# Patient Record
Sex: FEMALE | Race: BLACK OR AFRICAN AMERICAN | NOT HISPANIC OR LATINO | Employment: OTHER | URBAN - METROPOLITAN AREA
[De-identification: names, ages, dates, MRNs, and addresses within clinical notes are randomized per-mention and may not be internally consistent; named-entity substitution may affect disease eponyms.]

---

## 2017-11-10 ENCOUNTER — CONVERSION ENCOUNTER (OUTPATIENT)
Dept: GENERAL RADIOLOGY | Facility: HOSPITAL | Age: 72
End: 2017-11-10

## 2018-08-17 ENCOUNTER — OFFICE VISIT CONVERTED (OUTPATIENT)
Dept: CARDIOLOGY | Facility: CLINIC | Age: 73
End: 2018-08-17
Attending: INTERNAL MEDICINE

## 2018-12-10 ENCOUNTER — CONVERSION ENCOUNTER (OUTPATIENT)
Dept: GENERAL RADIOLOGY | Facility: HOSPITAL | Age: 73
End: 2018-12-10

## 2019-03-05 ENCOUNTER — OFFICE VISIT CONVERTED (OUTPATIENT)
Dept: CARDIOLOGY | Facility: CLINIC | Age: 74
End: 2019-03-05
Attending: INTERNAL MEDICINE

## 2019-03-21 ENCOUNTER — CONVERSION ENCOUNTER (OUTPATIENT)
Dept: CARDIOLOGY | Facility: CLINIC | Age: 74
End: 2019-03-21
Attending: INTERNAL MEDICINE

## 2019-05-28 ENCOUNTER — HOSPITAL ENCOUNTER (OUTPATIENT)
Dept: GENERAL RADIOLOGY | Facility: HOSPITAL | Age: 74
Discharge: HOME | End: 2019-05-28
Attending: NURSE PRACTITIONER

## 2019-06-18 ENCOUNTER — HOSPITAL ENCOUNTER (OUTPATIENT)
Dept: SURGERY | Facility: HOSPITAL | Age: 74
Setting detail: HOSPITAL OUTPATIENT SURGERY
Discharge: HOME | End: 2019-06-18
Attending: OPHTHALMOLOGY

## 2019-06-18 LAB — GLUCOSE BLD-MCNC: 111 MG/DL (ref 65–99)

## 2019-07-01 ENCOUNTER — HOSPITAL ENCOUNTER (OUTPATIENT)
Dept: OTHER | Facility: HOSPITAL | Age: 74
Discharge: HOME | End: 2019-07-01
Attending: NURSE PRACTITIONER

## 2019-07-01 LAB
T4 FREE SERPL-MCNC: 1.1 NG/DL (ref 0.9–1.8)
TSH SERPL-ACNC: 2.59 M[IU]/L (ref 0.27–4.2)

## 2019-07-02 ENCOUNTER — HOSPITAL ENCOUNTER (OUTPATIENT)
Dept: SURGERY | Facility: HOSPITAL | Age: 74
Setting detail: HOSPITAL OUTPATIENT SURGERY
Discharge: HOME | End: 2019-07-02
Attending: OPHTHALMOLOGY

## 2019-07-02 LAB — GLUCOSE BLD-MCNC: 121 MG/DL (ref 65–99)

## 2019-07-08 ENCOUNTER — HOSPITAL ENCOUNTER (OUTPATIENT)
Dept: GENERAL RADIOLOGY | Facility: HOSPITAL | Age: 74
Discharge: HOME | End: 2019-07-08
Attending: NURSE PRACTITIONER

## 2019-08-09 ENCOUNTER — CONVERSION ENCOUNTER (OUTPATIENT)
Dept: GASTROENTEROLOGY | Facility: CLINIC | Age: 74
End: 2019-08-09
Attending: INTERNAL MEDICINE

## 2019-09-23 ENCOUNTER — HOSPITAL ENCOUNTER (OUTPATIENT)
Dept: OTHER | Facility: HOSPITAL | Age: 74
Discharge: HOME | End: 2019-09-23
Attending: INTERNAL MEDICINE

## 2019-09-23 LAB
ALBUMIN SERPL-MCNC: 4.3 G/DL (ref 3.5–5)
ALBUMIN/GLOB SERPL: 1.7 {RATIO} (ref 1.4–2.6)
ALP SERPL-CCNC: 73 U/L (ref 43–160)
ALT SERPL-CCNC: 31 U/L (ref 10–40)
ANION GAP SERPL CALC-SCNC: 16 MMOL/L (ref 8–19)
AST SERPL-CCNC: 26 U/L (ref 15–50)
BILIRUB SERPL-MCNC: 0.29 MG/DL (ref 0.2–1.3)
BUN SERPL-MCNC: 8 MG/DL (ref 5–25)
BUN/CREAT SERPL: 9 {RATIO} (ref 6–20)
CALCIUM SERPL-MCNC: 9.4 MG/DL (ref 8.7–10.4)
CHLORIDE SERPL-SCNC: 108 MMOL/L (ref 99–111)
CHOLEST SERPL-MCNC: 131 MG/DL (ref 107–200)
CHOLEST/HDLC SERPL: 2.2 {RATIO} (ref 3–6)
CONV CO2: 24 MMOL/L (ref 22–32)
CONV TOTAL PROTEIN: 6.9 G/DL (ref 6.3–8.2)
CREAT UR-MCNC: 0.91 MG/DL (ref 0.5–0.9)
GFR SERPLBLD BASED ON 1.73 SQ M-ARVRAT: >60 ML/MIN/{1.73_M2}
GLOBULIN UR ELPH-MCNC: 2.6 G/DL (ref 2–3.5)
GLUCOSE SERPL-MCNC: 104 MG/DL (ref 65–99)
HDLC SERPL-MCNC: 59 MG/DL (ref 40–60)
LDLC SERPL CALC-MCNC: 61 MG/DL (ref 70–100)
OSMOLALITY SERPL CALC.SUM OF ELEC: 297 MOSM/KG (ref 273–304)
POTASSIUM SERPL-SCNC: 3.8 MMOL/L (ref 3.5–5.3)
SODIUM SERPL-SCNC: 144 MMOL/L (ref 135–147)
TRIGL SERPL-MCNC: 54 MG/DL (ref 40–150)
VLDLC SERPL-MCNC: 11 MG/DL (ref 5–37)

## 2019-09-27 ENCOUNTER — OFFICE VISIT CONVERTED (OUTPATIENT)
Dept: CARDIOLOGY | Facility: CLINIC | Age: 74
End: 2019-09-27
Attending: INTERNAL MEDICINE

## 2019-10-11 ENCOUNTER — HOSPITAL ENCOUNTER (OUTPATIENT)
Dept: GASTROENTEROLOGY | Facility: HOSPITAL | Age: 74
Setting detail: HOSPITAL OUTPATIENT SURGERY
Discharge: HOME | End: 2019-10-11
Attending: INTERNAL MEDICINE

## 2019-10-11 LAB — GLUCOSE BLD-MCNC: 102 MG/DL (ref 65–99)

## 2020-01-13 ENCOUNTER — HOSPITAL ENCOUNTER (OUTPATIENT)
Dept: OTHER | Facility: HOSPITAL | Age: 75
Discharge: HOME | End: 2020-01-13
Attending: PHYSICIAN ASSISTANT

## 2020-01-13 LAB
T4 FREE SERPL-MCNC: 1 NG/DL (ref 0.9–1.8)
TSH SERPL-ACNC: 6.08 M[IU]/L (ref 0.27–4.2)

## 2020-01-21 ENCOUNTER — HOSPITAL ENCOUNTER (OUTPATIENT)
Dept: GENERAL RADIOLOGY | Facility: HOSPITAL | Age: 75
Discharge: HOME | End: 2020-01-21
Attending: NURSE PRACTITIONER

## 2020-02-13 ENCOUNTER — HOSPITAL ENCOUNTER (OUTPATIENT)
Dept: OTHER | Facility: HOSPITAL | Age: 75
Discharge: HOME | End: 2020-02-13
Attending: PHYSICIAN ASSISTANT

## 2020-02-13 LAB
T4 FREE SERPL-MCNC: 1.3 NG/DL (ref 0.9–1.8)
TSH SERPL-ACNC: 1.34 M[IU]/L (ref 0.27–4.2)

## 2020-02-17 ENCOUNTER — HOSPITAL ENCOUNTER (OUTPATIENT)
Dept: ULTRASOUND IMAGING | Facility: HOSPITAL | Age: 75
Discharge: HOME | End: 2020-02-17
Attending: PHYSICIAN ASSISTANT

## 2020-06-10 ENCOUNTER — OFFICE VISIT CONVERTED (OUTPATIENT)
Dept: CARDIOLOGY | Facility: CLINIC | Age: 75
End: 2020-06-10
Attending: INTERNAL MEDICINE

## 2020-06-11 ENCOUNTER — HOSPITAL ENCOUNTER (OUTPATIENT)
Dept: LAB | Facility: HOSPITAL | Age: 75
Discharge: HOME | End: 2020-06-11
Attending: INTERNAL MEDICINE

## 2020-06-11 LAB
ALBUMIN SERPL-MCNC: 4.2 G/DL (ref 3.5–5)
ALBUMIN/GLOB SERPL: 1.5 {RATIO} (ref 1.4–2.6)
ALP SERPL-CCNC: 74 U/L (ref 43–160)
ALT SERPL-CCNC: 35 U/L (ref 10–40)
ANION GAP SERPL CALC-SCNC: 19 MMOL/L (ref 8–19)
AST SERPL-CCNC: 33 U/L (ref 15–50)
BILIRUB SERPL-MCNC: 0.35 MG/DL (ref 0.2–1.3)
BUN SERPL-MCNC: 11 MG/DL (ref 5–25)
BUN/CREAT SERPL: 13 {RATIO} (ref 6–20)
CALCIUM SERPL-MCNC: 9.5 MG/DL (ref 8.7–10.4)
CHLORIDE SERPL-SCNC: 108 MMOL/L (ref 99–111)
CHOLEST SERPL-MCNC: 133 MG/DL (ref 107–200)
CHOLEST/HDLC SERPL: 2.4 {RATIO} (ref 3–6)
CONV CO2: 21 MMOL/L (ref 22–32)
CONV TOTAL PROTEIN: 7 G/DL (ref 6.3–8.2)
CREAT UR-MCNC: 0.83 MG/DL (ref 0.5–0.9)
GFR SERPLBLD BASED ON 1.73 SQ M-ARVRAT: >60 ML/MIN/{1.73_M2}
GLOBULIN UR ELPH-MCNC: 2.8 G/DL (ref 2–3.5)
GLUCOSE SERPL-MCNC: 108 MG/DL (ref 65–99)
HDLC SERPL-MCNC: 56 MG/DL (ref 40–60)
LDLC SERPL CALC-MCNC: 65 MG/DL (ref 70–100)
OSMOLALITY SERPL CALC.SUM OF ELEC: 298 MOSM/KG (ref 273–304)
POTASSIUM SERPL-SCNC: 4.2 MMOL/L (ref 3.5–5.3)
SODIUM SERPL-SCNC: 144 MMOL/L (ref 135–147)
T4 FREE SERPL-MCNC: 1.4 NG/DL (ref 0.9–1.8)
TRIGL SERPL-MCNC: 62 MG/DL (ref 40–150)
TSH SERPL-ACNC: 2.56 M[IU]/L (ref 0.27–4.2)
VLDLC SERPL-MCNC: 12 MG/DL (ref 5–37)

## 2020-09-11 ENCOUNTER — HOSPITAL ENCOUNTER (OUTPATIENT)
Dept: LAB | Facility: HOSPITAL | Age: 75
Discharge: HOME | End: 2020-09-11
Attending: PHYSICIAN ASSISTANT

## 2020-09-12 LAB
T3FREE SERPL-MCNC: 2.7 PG/ML (ref 2–4.4)
T4 FREE SERPL-MCNC: 1.3 NG/DL (ref 0.9–1.8)
TSH SERPL-ACNC: 1.66 M[IU]/L (ref 0.27–4.2)

## 2020-12-07 ENCOUNTER — HOSPITAL ENCOUNTER (OUTPATIENT)
Dept: GENERAL RADIOLOGY | Facility: HOSPITAL | Age: 75
Discharge: HOME | End: 2020-12-07
Attending: PHYSICIAN ASSISTANT

## 2020-12-16 ENCOUNTER — OFFICE VISIT CONVERTED (OUTPATIENT)
Dept: CARDIOLOGY | Facility: CLINIC | Age: 75
End: 2020-12-16
Attending: INTERNAL MEDICINE

## 2020-12-17 ENCOUNTER — HOSPITAL ENCOUNTER (OUTPATIENT)
Dept: LAB | Facility: HOSPITAL | Age: 75
Discharge: HOME | End: 2020-12-17
Attending: INTERNAL MEDICINE

## 2020-12-17 LAB
ALBUMIN SERPL-MCNC: 4.2 G/DL (ref 3.5–5)
ALBUMIN/GLOB SERPL: 1.4 {RATIO} (ref 1.4–2.6)
ALP SERPL-CCNC: 76 U/L (ref 43–160)
ALT SERPL-CCNC: 29 U/L (ref 10–40)
ANION GAP SERPL CALC-SCNC: 14 MMOL/L (ref 8–19)
AST SERPL-CCNC: 31 U/L (ref 15–50)
BILIRUB SERPL-MCNC: 0.36 MG/DL (ref 0.2–1.3)
BUN SERPL-MCNC: 16 MG/DL (ref 5–25)
BUN/CREAT SERPL: 15 {RATIO} (ref 6–20)
CALCIUM SERPL-MCNC: 9.9 MG/DL (ref 8.7–10.4)
CHLORIDE SERPL-SCNC: 104 MMOL/L (ref 99–111)
CHOLEST SERPL-MCNC: 140 MG/DL (ref 107–200)
CHOLEST/HDLC SERPL: 2.5 {RATIO} (ref 3–6)
CONV CO2: 26 MMOL/L (ref 22–32)
CONV TOTAL PROTEIN: 7.1 G/DL (ref 6.3–8.2)
CREAT UR-MCNC: 1.07 MG/DL (ref 0.5–0.9)
GFR SERPLBLD BASED ON 1.73 SQ M-ARVRAT: 58 ML/MIN/{1.73_M2}
GLOBULIN UR ELPH-MCNC: 2.9 G/DL (ref 2–3.5)
GLUCOSE SERPL-MCNC: 107 MG/DL (ref 65–99)
HDLC SERPL-MCNC: 55 MG/DL (ref 40–60)
LDLC SERPL CALC-MCNC: 72 MG/DL (ref 70–100)
OSMOLALITY SERPL CALC.SUM OF ELEC: 292 MOSM/KG (ref 273–304)
POTASSIUM SERPL-SCNC: 4 MMOL/L (ref 3.5–5.3)
SODIUM SERPL-SCNC: 140 MMOL/L (ref 135–147)
TRIGL SERPL-MCNC: 67 MG/DL (ref 40–150)
VLDLC SERPL-MCNC: 13 MG/DL (ref 5–37)

## 2020-12-29 ENCOUNTER — HOSPITAL ENCOUNTER (OUTPATIENT)
Dept: ULTRASOUND IMAGING | Facility: HOSPITAL | Age: 75
Discharge: HOME | End: 2020-12-29
Attending: PHYSICIAN ASSISTANT

## 2021-02-10 ENCOUNTER — HOSPITAL ENCOUNTER (OUTPATIENT)
Dept: VACCINE CLINIC | Facility: HOSPITAL | Age: 76
Discharge: HOME | End: 2021-02-10
Attending: INTERNAL MEDICINE

## 2021-02-12 ENCOUNTER — HOSPITAL ENCOUNTER (OUTPATIENT)
Dept: PREADMISSION TESTING | Facility: HOSPITAL | Age: 76
Discharge: HOME | End: 2021-02-12
Attending: INTERNAL MEDICINE

## 2021-02-13 LAB — SARS-COV-2 RNA SPEC QL NAA+PROBE: NOT DETECTED

## 2021-03-03 ENCOUNTER — HOSPITAL ENCOUNTER (OUTPATIENT)
Dept: PREADMISSION TESTING | Facility: HOSPITAL | Age: 76
Discharge: HOME | End: 2021-03-03
Attending: INTERNAL MEDICINE

## 2021-03-04 LAB — SARS-COV-2 RNA SPEC QL NAA+PROBE: NOT DETECTED

## 2021-03-08 ENCOUNTER — HOSPITAL ENCOUNTER (OUTPATIENT)
Dept: GASTROENTEROLOGY | Facility: HOSPITAL | Age: 76
Setting detail: HOSPITAL OUTPATIENT SURGERY
Discharge: HOME | End: 2021-03-08
Attending: INTERNAL MEDICINE

## 2021-03-08 LAB — GLUCOSE BLD-MCNC: 115 MG/DL (ref 65–99)

## 2021-03-12 ENCOUNTER — HOSPITAL ENCOUNTER (OUTPATIENT)
Dept: VACCINE CLINIC | Facility: HOSPITAL | Age: 76
Discharge: HOME | End: 2021-03-12
Attending: INTERNAL MEDICINE

## 2021-04-26 ENCOUNTER — HOSPITAL ENCOUNTER (OUTPATIENT)
Dept: GENERAL RADIOLOGY | Facility: HOSPITAL | Age: 76
Discharge: HOME | End: 2021-04-26
Attending: NURSE PRACTITIONER

## 2021-05-10 ENCOUNTER — OFFICE VISIT CONVERTED (OUTPATIENT)
Dept: NEUROLOGY | Facility: CLINIC | Age: 76
End: 2021-05-10
Attending: NURSE PRACTITIONER

## 2021-05-13 NOTE — PROGRESS NOTES
"   Progress Note      Patient Name: Lizzeth Jean   Patient ID: [id number]   Sex: Female   YOB: 1945    Primary Care Provider: Radha Ritchie NP   Referring Provider: Radha Ritchie NP    Visit Date: Taylor 10, 2020    Provider: Dorian Turner MD   Location: [facility]   Location Address: [address] [place] [address]  [id number]   Location Phone: [phone]          Chief Complaint  · Coronary artery disease       History Of Present Illness  REFERRING CARE PROVIDER: Radha Ritchie NP   Lizzeth Jean is a 75 year old /Black female with coronary artery disease, prior stenting in 2002, hypertension and dyslipidemia, who has done well. No chest pain, shortness of breath or other complaints.   PAST MEDICAL HISTORY: Coronary artery disease with prior stent in 2002; hypertension; dyslipidemia; intermittent palpitations.   FAMILY HISTORY: Positive for hypertension and heart disease. Negative for diabetes.   PSYCHOSOCIAL HISTORY: Positive for mood changes and depression. She never used alcohol or tobacco.   CURRENT MEDICATIONS: include ASA 81 mg daily; Hydrocodone/acetaminophen b.i.d.; Hydroxyzine 25 mg daily; Zetia 10 mg daily; Atenolol 25 mg b.i.d.; Felodipine 10 mg daily; Citalopram 40 mg daily; Buspirone 15 mg b.i.d.; Crestor 5 mg daily; Synthroid 25 mcg daily. The dosage and frequency of the medications were reviewed with the patient.       Review of Systems  · Cardiovascular  o Admits  o : swelling (feet, ankles, hands)  o Denies  o : palpitations (fast, fluttering, or skipping beats), shortness of breath while walking or lying flat, chest pain or angina pectoris   · Respiratory  o Denies  o : chronic or frequent cough, asthma or wheezing      Vitals  Date Time BP Position Site L\R Cuff Size HR RR TEMP (F) WT  HT  BMI kg/m2 BSA m2 O2 Sat HC       06/10/2020 11:14 /44 Sitting    58 - R   142lbs 0oz 5'  2\" 25.97 1.68     06/10/2020 11:14 /62 " Sitting    58 - R                 Physical Examination  · Constitutional  o Appearance  o : Awake, alert, in no acute distress.   · Eyes  o Conjunctivae  o : Normal.  · Ears, Nose, Mouth and Throat  o Oral Cavity  o :   § Oral Mucosa  § : Normal.  · Neck  o Inspection/Palpation  o : No JVD. Good carotid upstroke. No thyromegaly.  · Respiratory  o Respiratory  o : Good respiratory effort. Clear to percussion and auscultation.  · Cardiovascular  o Heart  o :   § Auscultation of Heart  § : S1, S2 normal. Regular rate and rhythm without murmurs, gallops, or rubs.  o Peripheral Vascular System  o :   § Extremities  § : Good femoral and pedal pulses. No pedal edema.  · Gastrointestinal  o Abdominal Examination  o : Soft. No tenderness or masses felt. No hepatosplenomegaly. Abdominal aorta is not palpable.          Assessment     ASSESSMENT AND PLAN:    1.  Coronary artery disease with prior stenting:  No angina and on chronic ASA 81 mg once a day.  2.  Hyperlipidemia:  On Crestor and Zetia, tolerating well.  LDL previously at goal.  Repeat before the next visit.  3.  Hypertension controlled.    MD Louise Sommer/nhan         This note was transcribed by Nini Arango.  nhan/louise   The above service was transcribed by Nini Arango, and I attest to the accuracy of the note.  LOUISE.               Electronically Signed by: Nini Arango-, -Author on June 16, 2020 09:10:35 AM  Electronically Co-signed by: Dorian Turner MD -Reviewer on June 17, 2020 05:41:36 PM

## 2021-05-14 VITALS
HEIGHT: 62 IN | BODY MASS INDEX: 26.13 KG/M2 | DIASTOLIC BLOOD PRESSURE: 72 MMHG | HEART RATE: 58 BPM | SYSTOLIC BLOOD PRESSURE: 132 MMHG | WEIGHT: 142 LBS

## 2021-05-14 NOTE — PROGRESS NOTES
"   Progress Note      Patient Name: Lizzeth Jean   Patient ID: [id number]   Sex: Female   YOB: 1945    Primary Care Provider: Radha Ritchie NP   Referring Provider: Radha Ritchie NP    Visit Date: December 16, 2020    Provider: Dorian Turner MD   Location: [facility]   Location Address: [address] [place] [address], [facility]   [place], [place]  [id number]   Location Phone: [phone]          Chief Complaint     Coronary artery disease.       History Of Present Illness  REFERRING CARE PROVIDER: Radha Ritchie NP   Lizzeth Jean is a 75 year old /Black female with known CAD and prior stent in 2002, hypertension, and dyslipidemia who has had dyspnea on exertion issues since being seen last. No anginal discomfort. No weight gain.   PAST MEDICAL HISTORY: Coronary artery disease with prior stent in 2002; Dyslipidemia; Hypertension; Palpitations.   PSYCHOSOCIAL HISTORY: Previously smoked, but quit 20 years ago. Denies alcohol use.   CURRENT MEDICATIONS: Zetia 10 mg daily; Crestor 5 mg daily; aspirin 81 mg daily; nitroglycerin 0.4 mg sublingual p.r.n.; Plendil 10 mg daily; Celexa 40 mg daily; Buspar 15 mg 1/2 to 1 daily; Norco p.r.n.; levothyroxine 25 mcg daily; Tenormin 25 mg; Advair.      ALLERGIES:  Adhesive Tape; Latex Exam Gloves; Mobic; morphine; Omnicef; Toprol XL; Tricor; Zocor.  Some antibiotics.       Review of Systems  · Cardiovascular  o Admits  o : shortness of breath while walking or lying flat  o Denies  o : palpitations (fast, fluttering, or skipping beats), swelling (feet, ankles, hands), chest pain or angina pectoris   · Respiratory  o Denies  o : chronic or frequent cough      Vitals  Date Time BP Position Site L\R Cuff Size HR RR TEMP (F) WT  HT  BMI kg/m2 BSA m2 O2 Sat FR L/min FiO2        12/16/2020 10:39 /72 Sitting    58 - R   142lbs 0oz 5'  2\" 25.97 1.68             Physical Examination  · Constitutional  o Appearance  o : Awake, alert, in no acute distress. "   · Eyes  o Conjunctivae  o : Normal.  · Ears, Nose, Mouth and Throat  o Oral Cavity  o :   § Oral Mucosa  § : Normal.  · Neck  o Inspection/Palpation  o : No JVD. Good carotid upstroke. No thyromegaly.  · Respiratory  o Respiratory  o : Good respiratory effort. Clear to percussion and auscultation.  · Cardiovascular  o Heart  o :   § Auscultation of Heart  § : S1, S2 normal. Regular rate and rhythm without murmurs, gallops, or rubs.  o Peripheral Vascular System  o :   § Extremities  § : Good femoral and pedal pulses. No pedal edema.  · Gastrointestinal  o Abdominal Examination  o : Soft. No tenderness or masses felt. No hepatosplenomegaly. Abdominal aorta is not palpable.  · EKG  o EKG  o : Performed in the office today.  o Indications  o : Coronary artery disease.  o Results  o : Normal sinus rhythm with old inferior wall MI.  o Comparison  o : No change from prior EKG.           Assessment     ASSESSMENT & PLAN:     1.  Coronary artery disease with prior stent.  No angina.  On chronic aspirin 81 mg once a day.  2.  Hyperlipidemia.  Patient on statin and Zetia and tolerating well.  Awaiting lipid results.  Goal LDL of less 70.  3.  Hypertension, controlled.             Electronically Signed by: Jazmine Pizano-, Other -Author on December 23, 2020 10:36:30 AM  Electronically Co-signed by: Dorian Turner MD -Reviewer on January 4, 2021 01:00:46 PM

## 2021-05-15 VITALS
SYSTOLIC BLOOD PRESSURE: 118 MMHG | HEIGHT: 62 IN | BODY MASS INDEX: 25.4 KG/M2 | WEIGHT: 138 LBS | HEART RATE: 58 BPM | DIASTOLIC BLOOD PRESSURE: 56 MMHG

## 2021-05-15 VITALS
HEART RATE: 58 BPM | WEIGHT: 142 LBS | HEIGHT: 62 IN | DIASTOLIC BLOOD PRESSURE: 44 MMHG | BODY MASS INDEX: 26.13 KG/M2 | SYSTOLIC BLOOD PRESSURE: 116 MMHG

## 2021-05-16 VITALS
WEIGHT: 130 LBS | HEIGHT: 62 IN | HEART RATE: 62 BPM | BODY MASS INDEX: 23.92 KG/M2 | SYSTOLIC BLOOD PRESSURE: 126 MMHG | DIASTOLIC BLOOD PRESSURE: 76 MMHG

## 2021-05-16 VITALS
HEIGHT: 62 IN | HEART RATE: 66 BPM | DIASTOLIC BLOOD PRESSURE: 60 MMHG | WEIGHT: 144 LBS | BODY MASS INDEX: 26.5 KG/M2 | SYSTOLIC BLOOD PRESSURE: 114 MMHG

## 2021-05-25 ENCOUNTER — HOSPITAL ENCOUNTER (OUTPATIENT)
Dept: MRI IMAGING | Facility: HOSPITAL | Age: 76
Discharge: HOME | End: 2021-05-25
Attending: NURSE PRACTITIONER

## 2021-05-25 LAB
CREAT BLD-MCNC: 1 MG/DL (ref 0.6–1.4)
GFR SERPLBLD BASED ON 1.73 SQ M-ARVRAT: >60 ML/MIN/{1.73_M2}

## 2021-06-03 ENCOUNTER — HOSPITAL ENCOUNTER (OUTPATIENT)
Dept: GENERAL RADIOLOGY | Facility: HOSPITAL | Age: 76
Discharge: HOME | End: 2021-06-03
Attending: PHYSICIAN ASSISTANT
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
## 2021-06-08 ENCOUNTER — TELEPHONE (OUTPATIENT)
Dept: CARDIOLOGY | Facility: CLINIC | Age: 76
End: 2021-06-08

## 2021-06-08 ENCOUNTER — OFFICE VISIT (OUTPATIENT)
Dept: NEUROSURGERY | Facility: CLINIC | Age: 76
End: 2021-06-08

## 2021-06-08 VITALS
TEMPERATURE: 96.9 F | BODY MASS INDEX: 26.54 KG/M2 | OXYGEN SATURATION: 99 % | SYSTOLIC BLOOD PRESSURE: 123 MMHG | WEIGHT: 144.2 LBS | HEART RATE: 65 BPM | HEIGHT: 62 IN | DIASTOLIC BLOOD PRESSURE: 55 MMHG

## 2021-06-08 DIAGNOSIS — M48.062 SPINAL STENOSIS, LUMBAR REGION, WITH NEUROGENIC CLAUDICATION: Primary | ICD-10-CM

## 2021-06-08 PROCEDURE — 99215 OFFICE O/P EST HI 40 MIN: CPT | Performed by: NURSE PRACTITIONER

## 2021-06-08 RX ORDER — HYDROCODONE BITARTRATE AND ACETAMINOPHEN 5; 325 MG/1; MG/1
1 TABLET ORAL EVERY 8 HOURS PRN
COMMUNITY
End: 2021-09-01 | Stop reason: HOSPADM

## 2021-06-08 RX ORDER — FELODIPINE 10 MG/1
10 TABLET, EXTENDED RELEASE ORAL DAILY
COMMUNITY

## 2021-06-08 RX ORDER — NITROGLYCERIN 0.4 MG/1
0.4 TABLET SUBLINGUAL
COMMUNITY

## 2021-06-08 RX ORDER — ATORVASTATIN CALCIUM 40 MG/1
TABLET, FILM COATED ORAL
COMMUNITY
End: 2021-07-28

## 2021-06-08 RX ORDER — ATENOLOL 25 MG/1
25 TABLET ORAL DAILY
COMMUNITY

## 2021-06-08 RX ORDER — LEVOTHYROXINE SODIUM 0.03 MG/1
25 TABLET ORAL
COMMUNITY

## 2021-06-08 RX ORDER — CITALOPRAM 40 MG/1
40 TABLET ORAL DAILY
COMMUNITY

## 2021-06-08 RX ORDER — HYDROXYZINE HYDROCHLORIDE 25 MG/1
25 TABLET, FILM COATED ORAL DAILY
COMMUNITY

## 2021-06-08 NOTE — ASSESSMENT & PLAN NOTE
Dr. More discussed surgical options with patient. Recommending Right Approach Minimally Invasive Laminectomy at Lumbar 4 to Lumbar 5 and Lumbar 5 to Sacral 1. Potential risks discussed. She will need Cardiac Clearance. She will call once she has seen the cardiologist and we will plan surgery at that time.

## 2021-06-08 NOTE — PATIENT INSTRUCTIONS
-Cardiac Clearance  -Call office once cleared by cardiology to schedule right approach minimally invasive laminectomy lumbar 4 to lumbar 5 and lumbar 5 to sacral 1
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
## 2021-06-10 ENCOUNTER — TRANSCRIBE ORDERS (OUTPATIENT)
Dept: ADMINISTRATIVE | Facility: HOSPITAL | Age: 76
End: 2021-06-10

## 2021-06-10 DIAGNOSIS — E04.9 GOITER, NODULAR: Primary | ICD-10-CM

## 2021-06-15 ENCOUNTER — OFFICE VISIT (OUTPATIENT)
Dept: GASTROENTEROLOGY | Facility: CLINIC | Age: 76
End: 2021-06-15

## 2021-06-15 VITALS
TEMPERATURE: 98.9 F | DIASTOLIC BLOOD PRESSURE: 77 MMHG | WEIGHT: 147 LBS | BODY MASS INDEX: 27.05 KG/M2 | HEIGHT: 62 IN | SYSTOLIC BLOOD PRESSURE: 154 MMHG | HEART RATE: 66 BPM

## 2021-06-15 DIAGNOSIS — K21.00 GASTROESOPHAGEAL REFLUX DISEASE WITH ESOPHAGITIS WITHOUT HEMORRHAGE: Primary | ICD-10-CM

## 2021-06-15 DIAGNOSIS — K44.9 HIATAL HERNIA: ICD-10-CM

## 2021-06-15 DIAGNOSIS — Z86.010 PERSONAL HISTORY OF COLONIC POLYPS: ICD-10-CM

## 2021-06-15 PROBLEM — F41.1 GENERALIZED ANXIETY DISORDER: Status: ACTIVE | Noted: 2021-04-02

## 2021-06-15 PROBLEM — M46.1 INFLAMMATION OF SACROILIAC JOINT: Status: ACTIVE | Noted: 2019-03-12

## 2021-06-15 PROBLEM — I51.9 HEART DISEASE: Status: ACTIVE | Noted: 2021-06-15

## 2021-06-15 PROBLEM — K21.9 GASTRIC REFLUX: Status: ACTIVE | Noted: 2021-06-15

## 2021-06-15 PROBLEM — Z51.81 MEDICATION MONITORING ENCOUNTER: Status: ACTIVE | Noted: 2019-03-25

## 2021-06-15 PROBLEM — E03.9 HYPOTHYROIDISM: Status: ACTIVE | Noted: 2018-05-02

## 2021-06-15 PROBLEM — M53.3 SACROILIAC JOINT PAIN: Status: ACTIVE | Noted: 2017-06-13

## 2021-06-15 PROBLEM — M54.2 NECK PAIN: Status: ACTIVE | Noted: 2021-06-15

## 2021-06-15 PROBLEM — E07.9 THYROID DISORDER: Status: ACTIVE | Noted: 2021-06-15

## 2021-06-15 PROBLEM — E78.00 HIGH CHOLESTEROL: Status: ACTIVE | Noted: 2021-06-15

## 2021-06-15 PROBLEM — M19.90 ARTHRITIS: Status: ACTIVE | Noted: 2021-06-15

## 2021-06-15 PROBLEM — L43.9 LICHEN PLANUS: Status: ACTIVE | Noted: 2018-05-02

## 2021-06-15 PROBLEM — Z79.899 LONG TERM CURRENT USE OF THERAPEUTIC DRUG: Status: ACTIVE | Noted: 2018-02-03

## 2021-06-15 PROBLEM — J45.20 MILD INTERMITTENT ASTHMA: Status: ACTIVE | Noted: 2018-05-02

## 2021-06-15 PROBLEM — F32.1 MODERATE MAJOR DEPRESSION, SINGLE EPISODE (HCC): Status: ACTIVE | Noted: 2018-05-02

## 2021-06-15 PROBLEM — M47.816 LUMBAR SPONDYLOSIS: Status: ACTIVE | Noted: 2019-03-25

## 2021-06-15 PROBLEM — E11.9 DIABETES (HCC): Status: ACTIVE | Noted: 2021-06-15

## 2021-06-15 PROBLEM — R73.03 PREDIABETES: Status: ACTIVE | Noted: 2021-04-02

## 2021-06-15 PROCEDURE — 99213 OFFICE O/P EST LOW 20 MIN: CPT | Performed by: NURSE PRACTITIONER

## 2021-06-15 RX ORDER — PHENOL 1.4 %
1 AEROSOL, SPRAY (ML) MUCOUS MEMBRANE DAILY
COMMUNITY
End: 2022-02-08 | Stop reason: ALTCHOICE

## 2021-06-15 RX ORDER — ERGOCALCIFEROL 1.25 MG/1
CAPSULE ORAL
COMMUNITY
End: 2021-08-16

## 2021-06-15 RX ORDER — AMPICILLIN TRIHYDRATE 250 MG
500 CAPSULE ORAL DAILY
COMMUNITY

## 2021-06-15 RX ORDER — EZETIMIBE 10 MG/1
10 TABLET ORAL DAILY
COMMUNITY
Start: 2021-04-21 | End: 2021-08-16

## 2021-06-15 RX ORDER — ASPIRIN 81 MG/1
81 TABLET, CHEWABLE ORAL DAILY
COMMUNITY

## 2021-06-15 RX ORDER — LANCETS 30 GAUGE
1 EACH MISCELLANEOUS 3 TIMES DAILY
COMMUNITY
Start: 2021-04-02

## 2021-06-15 RX ORDER — NALOXONE HYDROCHLORIDE 4 MG/.1ML
SPRAY NASAL
COMMUNITY
End: 2021-08-16

## 2021-06-15 NOTE — PROGRESS NOTES
Patient Name: Lizzeth Jean   Visit Date: 06/15/2021   Patient ID: [id number]  Provider: ELVER Carrillo    Sex: female  Location:  Location Address:  Location Phone: [phone]  [place] [address] [phone]    YOB: 1945      Primary Care Provider Radha Ritchie APRN      Referring Provider: No ref. provider found        Chief Complaint  EGD (Follow up 3.8.21)    History of Present Illness   Pt has hx of Cannon's esophagus per endo 2017, path was negative.  Pt has not been seen in office since 2016.  EGD per recall 3/8/2021: Medium-sized hiatal hernia, grade B esophagitis--biopsy with reflux esophagitis, normal stomach--gastropathy and duodenum  Last colonoscopy 10/2019: Adequate prep, 2 small adenomatous polyps in the distal ascending colon completely removed    Pt has no c/o HB at this time, no abd pain, no issue w BM's, no blood in stool.     Past Medical History:   Diagnosis Date   • Arthritis    • Back pain    • Carpal tunnel syndrome 04/16/2014   • Cervical spinal stenosis 03/22/2016    C3-4 & C4-5   • Cervicalgia    • Chronic pain syndrome    • Diabetes (CMS/HCC)    • Disc degeneration, lumbar 04/16/2014   • Essential hypertension    • Gastric reflux    • Heart attack (CMS/HCC)    • Heart disease    • High cholesterol    • Hip bursitis 04/01/2014   • Leg pain    • Low back pain 07/24/2014   • Lumbar pain    • Neck pain    • Stomach disorder    • Thyroid disorder        Past Surgical History:   Procedure Laterality Date   • BREAST BIOPSY     • COLONOSCOPY  2016    [facility], History of colon polyp    • CORONARY ANGIOPLASTY WITH STENT PLACEMENT     • ENDOSCOPY      [facility], 2017 [facility]   • ENDOSCOPY     • HYSTERECTOMY     • UPPER GASTROINTESTINAL ENDOSCOPY  03/08/2021    Dr. Leal   • US GUIDED FINE NEEDLE ASPIRATION  12/29/2020         Current Outpatient Medications:   •  aspirin 81 MG chewable tablet, aspirin 81 mg chewable tablet  Chew 1 tablet every other day by oral  route for 90 days., Disp: , Rfl:   •  atenolol (TENORMIN) 25 MG tablet, atenolol 25 mg tablet  Take 1 tablet every day by oral route in the morning for 90 days., Disp: , Rfl:   •  atorvastatin (Lipitor) 40 MG tablet, Lipitor 40 mg oral tablet take 1 tablet (40 mg) by oral route once daily   Suspended, Disp: , Rfl:   •  calcium carbonate (OS-SHIRA) 1250 (500 Ca) MG tablet, Calcium 500 500 mg calcium (1,250 mg) oral tablet take 1 tablet by oral route 2 times a day   Active, Disp: , Rfl:   •  Cinnamon 500 MG capsule, Cinnamon 500 mg oral capsule take 2 capsules by oral route daily   Active, Disp: , Rfl:   •  citalopram (CeleXA) 40 MG tablet, citalopram 40 mg tablet  Take 1 tablet every day by oral route in the morning for 90 days., Disp: , Rfl:   •  Easy Touch Lancets 30G/Twist misc, 1 each by Other route 3 (Three) Times a Day. use to test blood sugar 3 times daily, Disp: , Rfl:   •  ergocalciferol (ERGOCALCIFEROL) 1.25 MG (89343 UT) capsule, , Disp: , Rfl:   •  ezetimibe (ZETIA) 10 MG tablet, , Disp: , Rfl:   •  felodipine (PLENDIL) 10 MG 24 hr tablet, felodipine ER 10 mg tablet,extended release 24 hr  Take 1 tablet every day by oral route in the morning for 90 days., Disp: , Rfl:   •  fluticasone-salmeterol (Advair Diskus) 250-50 MCG/DOSE DISKUS, Advair Diskus 250 mcg-50 mcg/dose powder for inhalation  Inhale 1 puff every day by inhalation route for 90 days., Disp: , Rfl:   •  HYDROcodone-acetaminophen (NORCO) 5-325 MG per tablet, hydrocodone 5 mg-acetaminophen 325 mg tablet, Disp: , Rfl:   •  hydrOXYzine (ATARAX) 25 MG tablet, hydroxyzine HCl 25 mg tablet  Take 1 tablet every day by oral route in the morning for 90 days., Disp: , Rfl:   •  levothyroxine (Synthroid) 25 MCG tablet, Synthroid 25 mcg tablet  Take 1 tablet every day by oral route in the morning for 90 days., Disp: , Rfl:   •  naloxone (Narcan) 4 MG/0.1ML nasal spray, Narcan 4 mg/actuation nasal spray, Disp: , Rfl:   •  nitroglycerin (NITROSTAT) 0.4 MG SL  "tablet, nitroglycerin 0.4 mg sublingual tablet  Place 1 tablet every day by sublingual route as needed for 90 days., Disp: , Rfl:      Allergies   Allergen Reactions   • Latex Anaphylaxis   • Toprol Xl [Metoprolol] Anaphylaxis   • Lipitor [Atorvastatin] Itching   • Mobic [Meloxicam] Itching   • Morphine Itching   • Niacin Dizziness   • Tricor [Fenofibrate] Hives   • Zocor [Simvastatin] Hives   • Cefdinir Rash       Family History   Problem Relation Age of Onset   • Lung cancer Mother    • Heart attack Maternal Grandmother    • Lung cancer Maternal Grandmother    • Lung cancer Other    • Colon cancer Neg Hx         Social History     Tobacco Use   • Smoking status: Former Smoker     Packs/day: 1.00   • Smokeless tobacco: Never Used   Vaping Use   • Vaping Use: Never used   Substance Use Topics   • Alcohol use: Not Currently   • Drug use: Not on file       Objective     Vital Signs:   /77 (BP Location: Right arm, Patient Position: Sitting, Cuff Size: Adult)   Pulse 66   Temp 98.9 °F (37.2 °C)   Ht 157.5 cm (62\")   Wt 66.7 kg (147 lb)   BMI 26.89 kg/m²       Physical Exam  Constitutional:       General: He is not in acute distress.     Appearance: Normal appearance.   HENT:      Head: Normocephalic and atraumatic.      Nose: Nose normal.   Pulmonary:      Effort: Pulmonary effort is normal. No respiratory distress.   Abdominal:      General: Abdomen is flat.      Palpations: Abdomen is soft. There is no mass.      Tenderness: There is no abdominal tenderness. There is no guarding.   Musculoskeletal:      Cervical back: Neck supple.      Right lower leg: No edema.      Left lower leg: No edema.   Skin:     General: Skin is warm and dry.   Neurological:      General: No focal deficit present.      Mental Status: He is alert and oriented to person, place, and time.      Gait: Gait normal.   Psychiatric:         Mood and Affect: Mood normal.         Speech: Speech normal.         Behavior: Behavior normal.       "   Thought Content: Thought content normal.     Result Review :                 Assessment and Plan    Diagnoses and all orders for this visit:    1. Gastroesophageal reflux disease with esophagitis without hemorrhage (Primary)    2. Hiatal hernia    3. Personal history of colonic polyps          Follow Up   -Recommended small frequent meals, NPO 4 hrs before HS, elevated HOB by placing brick under bed frame by 4-6 inches.  -If needed may take Pepcid OTC or 2 wk course of Nexium if HB occurs; pt had questions of what to do should this happen. Call if any persistent symptoms  -Colon recall 10/2024  -Call or return to clinic PRN if any change in bowel pattern, blood in stool, or new GI sx.   -Patient was given instructions and counseling regarding her condition or for health maintenance advice. Please see specific information pulled into the AVS if appropriate.

## 2021-07-15 VITALS — BODY MASS INDEX: 26.93 KG/M2 | TEMPERATURE: 97.4 F | WEIGHT: 146.31 LBS | HEIGHT: 62 IN

## 2021-07-28 ENCOUNTER — OFFICE VISIT (OUTPATIENT)
Dept: CARDIOLOGY | Facility: CLINIC | Age: 76
End: 2021-07-28

## 2021-07-28 VITALS
DIASTOLIC BLOOD PRESSURE: 70 MMHG | HEIGHT: 62 IN | WEIGHT: 144 LBS | HEART RATE: 67 BPM | SYSTOLIC BLOOD PRESSURE: 128 MMHG | BODY MASS INDEX: 26.5 KG/M2

## 2021-07-28 DIAGNOSIS — R06.09 DYSPNEA ON EXERTION: ICD-10-CM

## 2021-07-28 DIAGNOSIS — E78.5 HYPERLIPIDEMIA LDL GOAL <70: ICD-10-CM

## 2021-07-28 DIAGNOSIS — Z98.61 CAD S/P PERCUTANEOUS CORONARY ANGIOPLASTY: Primary | ICD-10-CM

## 2021-07-28 DIAGNOSIS — I10 ESSENTIAL HYPERTENSION: ICD-10-CM

## 2021-07-28 DIAGNOSIS — I25.10 CAD S/P PERCUTANEOUS CORONARY ANGIOPLASTY: Primary | ICD-10-CM

## 2021-07-28 PROCEDURE — 99214 OFFICE O/P EST MOD 30 MIN: CPT | Performed by: INTERNAL MEDICINE

## 2021-07-28 RX ORDER — ROSUVASTATIN CALCIUM 5 MG/1
5 TABLET, COATED ORAL DAILY
COMMUNITY
End: 2021-07-28

## 2021-07-28 RX ORDER — ROSUVASTATIN CALCIUM 10 MG/1
10 TABLET, COATED ORAL DAILY
Qty: 90 TABLET | Refills: 3 | Status: SHIPPED | OUTPATIENT
Start: 2021-07-28 | End: 2022-02-08 | Stop reason: SDUPTHER

## 2021-07-28 RX ORDER — BUSPIRONE HYDROCHLORIDE 15 MG/1
7.5 TABLET ORAL 3 TIMES DAILY PRN
COMMUNITY

## 2021-07-28 NOTE — PROGRESS NOTES
Chief Complaint  Follow-up (6 mos/ Surgery clearance), Heart disease, Hyperlipidemia, Diabetes, and Hypothyroidism    Subjective    Patient is doing well with no exertional chest discomfort mild dyspnea on exertion symptoms.  She has been evaluated by Dr. More for lower back surgery and has a limited exercise capacity though not able to ambulate greater than 2 flights of stairs due to back pain problems    Past Medical History:   Diagnosis Date   • Arthritis    • Back pain    • CAD S/P percutaneous coronary angioplasty 7/28/2021    Stent 2002   • Carpal tunnel syndrome 04/16/2014   • Cervical spinal stenosis 03/22/2016    C3-4 & C4-5   • Cervicalgia    • Chronic pain syndrome    • Diabetes (CMS/HCC)    • Disc degeneration, lumbar 04/16/2014   • Essential hypertension    • Gastric reflux    • Heart attack (CMS/HCC)    • Heart disease    • High cholesterol    • Hip bursitis 04/01/2014   • Hypothyroidism    • Leg pain    • Low back pain 07/24/2014   • Lumbar pain    • Neck pain    • Stomach disorder    • Thyroid disorder          Current Outpatient Medications:   •  aspirin 81 MG chewable tablet, aspirin 81 mg chewable tablet  Chew 1 tablet every other day by oral route for 90 days., Disp: , Rfl:   •  atenolol (TENORMIN) 25 MG tablet, 25 mg 2 (two) times a day., Disp: , Rfl:   •  busPIRone (BUSPAR) 15 MG tablet, Take 15 mg by mouth 2 (two) times a day., Disp: , Rfl:   •  calcium carbonate (OS-SHIRA) 1250 (500 Ca) MG tablet, Calcium 500 500 mg calcium (1,250 mg) oral tablet take 1 tablet by oral route 2 times a day   Active, Disp: , Rfl:   •  Cinnamon 500 MG capsule, Cinnamon 500 mg oral capsule take 2 capsules by oral route daily   Active, Disp: , Rfl:   •  citalopram (CeleXA) 40 MG tablet, citalopram 40 mg tablet  Take 1 tablet every day by oral route in the morning for 90 days., Disp: , Rfl:   •  COLLAGEN PO, Take  by mouth Daily., Disp: , Rfl:   •  Easy Touch Lancets 30G/Twist misc, 1 each by Other route 3 (Three)  Times a Day. use to test blood sugar 3 times daily, Disp: , Rfl:   •  felodipine (PLENDIL) 10 MG 24 hr tablet, felodipine ER 10 mg tablet,extended release 24 hr  Take 1 tablet every day by oral route in the morning for 90 days., Disp: , Rfl:   •  fluticasone-salmeterol (Advair Diskus) 250-50 MCG/DOSE DISKUS, Advair Diskus 250 mcg-50 mcg/dose powder for inhalation  Inhale 1 puff every day by inhalation route for 90 days., Disp: , Rfl:   •  HYDROcodone-acetaminophen (NORCO) 5-325 MG per tablet, hydrocodone 5 mg-acetaminophen 325 mg tablet, Disp: , Rfl:   •  hydrOXYzine (ATARAX) 25 MG tablet, hydroxyzine HCl 25 mg tablet  Take 1 tablet every day by oral route in the morning for 90 days., Disp: , Rfl:   •  levothyroxine (Synthroid) 25 MCG tablet, Synthroid 25 mcg tablet  Take 1 tablet every day by oral route in the morning for 90 days., Disp: , Rfl:   •  naloxone (Narcan) 4 MG/0.1ML nasal spray, Q 8 hrs, Disp: , Rfl:   •  nitroglycerin (NITROSTAT) 0.4 MG SL tablet, nitroglycerin 0.4 mg sublingual tablet  Place 1 tablet every day by sublingual route as needed for 90 days., Disp: , Rfl:   •  ergocalciferol (ERGOCALCIFEROL) 1.25 MG (78598 UT) capsule, , Disp: , Rfl:   •  ezetimibe (ZETIA) 10 MG tablet, Take 10 mg by mouth Daily., Disp: , Rfl:   •  rosuvastatin (CRESTOR) 10 MG tablet, Take 1 tablet by mouth Daily., Disp: 90 tablet, Rfl: 3    Medications Discontinued During This Encounter   Medication Reason   • rosuvastatin (CRESTOR) 5 MG tablet    • atorvastatin (Lipitor) 40 MG tablet      Allergies   Allergen Reactions   • Latex Anaphylaxis   • Toprol Xl [Metoprolol] Anaphylaxis   • Lipitor [Atorvastatin] Itching   • Mobic [Meloxicam] Itching   • Morphine Itching   • Niacin Dizziness   • Tricor [Fenofibrate] Hives   • Zocor [Simvastatin] Hives   • Cefdinir Rash        Social History     Tobacco Use   • Smoking status: Former Smoker     Packs/day: 1.00   • Smokeless tobacco: Never Used   Vaping Use   • Vaping Use: Never  "used   Substance Use Topics   • Alcohol use: Not Currently   • Drug use: Never       Family History   Problem Relation Age of Onset   • Lung cancer Mother    • Heart attack Maternal Grandmother    • Lung cancer Maternal Grandmother    • Lung cancer Other    • Colon cancer Neg Hx         Objective     /70   Pulse 67   Ht 157.5 cm (62\")   Wt 65.3 kg (144 lb)   BMI 26.34 kg/m²       Physical Exam    General Appearance:   · no acute distress  · Alert and oriented x3  HENT:   · lips not cyanotic  · Atraumatic  Neck:  · No jvd   · supple  Respiratory:  · no respiratory distress  · normal breath sounds  · no rales  Cardiovascular:  · Regular rate and rhythm  · no S3, no S4   · no murmur  · no rub  Extremities  · No cyanosis  · lower extremity edema: none    Skin:   · warm, dry  · No rashes      Result Review :     No results found for: PROBNP  CMP    CMP 12/17/20   Glucose 107 (A)   BUN 16   Creatinine 1.07 (A)   Sodium 140   Potassium 4.0   Chloride 104   Calcium 9.9   Albumin 4.2   Total Bilirubin 0.36   Alkaline Phosphatase 76   AST (SGOT) 31   ALT (SGPT) 29   (A) Abnormal value               Lab Results   Component Value Date    TSH 1.660 09/11/2020      Lab Results   Component Value Date    FREET4 1.3 09/11/2020      No results found for: DDIMERQUANT  Magnesium   Date Value Ref Range Status   12/09/2019 2.22 1.60 - 2.30 mg/dL Final      No results found for: DIGOXIN   Lab Results   Component Value Date    TROPONINT <0.01 12/09/2019           Lipid Panel    Lipid Panel 12/17/20   Total Cholesterol 140   Triglycerides 67   HDL Cholesterol 55   VLDL Cholesterol 13   LDL Cholesterol  72      Comments are available for some flowsheets but are not being displayed.           No results found for: POCTROP                   Diagnoses and all orders for this visit:    1. CAD S/P percutaneous coronary angioplasty (Primary)  Assessment & Plan:  Patient with no anginal symptoms but limited exercise capacity due to back " pain issues recommend a noninvasive stress test prior to proceeding with surgery      2. Essential hypertension  Assessment & Plan:  Controlled continue with current meds      3. Hyperlipidemia LDL goal <70  Assessment & Plan:  LDL is above goal we will increase Crestor to 10 and repeat lipids    Orders:  -     Hepatic Function Panel; Future  -     Lipid Panel; Future    4. Dyspnea on exertion  -     Stress Test With Myocardial Perfusion One Day; Future    Other orders  -     rosuvastatin (CRESTOR) 10 MG tablet; Take 1 tablet by mouth Daily.  Dispense: 90 tablet; Refill: 3          Follow Up     Return in about 6 months (around 1/28/2022).    Patient was given instructions and counseling regarding her condition or for health maintenance advice. Please see specific information pulled into the AVS if appropriate.

## 2021-07-28 NOTE — ASSESSMENT & PLAN NOTE
Patient with no anginal symptoms but limited exercise capacity due to back pain issues recommend a noninvasive stress test prior to proceeding with surgery

## 2021-08-05 ENCOUNTER — HOSPITAL ENCOUNTER (OUTPATIENT)
Dept: NUCLEAR MEDICINE | Facility: HOSPITAL | Age: 76
Discharge: HOME | End: 2021-08-05

## 2021-08-05 DIAGNOSIS — R06.09 DYSPNEA ON EXERTION: ICD-10-CM

## 2021-08-05 PROCEDURE — 93017 CV STRESS TEST TRACING ONLY: CPT

## 2021-08-05 PROCEDURE — A9502 TC99M TETROFOSMIN: HCPCS | Performed by: INTERNAL MEDICINE

## 2021-08-05 PROCEDURE — 93016 CV STRESS TEST SUPVJ ONLY: CPT | Performed by: NURSE PRACTITIONER

## 2021-08-05 PROCEDURE — 78452 HT MUSCLE IMAGE SPECT MULT: CPT | Performed by: INTERNAL MEDICINE

## 2021-08-05 PROCEDURE — 78452 HT MUSCLE IMAGE SPECT MULT: CPT

## 2021-08-05 PROCEDURE — 93018 CV STRESS TEST I&R ONLY: CPT | Performed by: INTERNAL MEDICINE

## 2021-08-05 PROCEDURE — 0 TECHNETIUM TETROFOSMIN KIT: Performed by: INTERNAL MEDICINE

## 2021-08-05 PROCEDURE — 25010000002 REGADENOSON 0.4 MG/5ML SOLUTION

## 2021-08-05 RX ADMIN — TETROFOSMIN 1 DOSE: 1.38 INJECTION, POWDER, LYOPHILIZED, FOR SOLUTION INTRAVENOUS at 09:38

## 2021-08-05 RX ADMIN — REGADENOSON 0.4 MG: 0.08 INJECTION, SOLUTION INTRAVENOUS at 09:38

## 2021-08-05 RX ADMIN — TETROFOSMIN 1 DOSE: 1.38 INJECTION, POWDER, LYOPHILIZED, FOR SOLUTION INTRAVENOUS at 08:33

## 2021-08-07 LAB
BH CV IMMEDIATE POST RECOVERY TECH DATA SYMPTOMS: NORMAL
BH CV IMMEDIATE POST TECH DATA BLOOD PRESSURE: NORMAL MMHG
BH CV IMMEDIATE POST TECH DATA HEART RATE: 68 BPM
BH CV IMMEDIATE POST TECH DATA OXYGEN SATS: 98 %
BH CV REST NUCLEAR ISOTOPE DOSE: 10.3 MCI
BH CV SIX MINUTE RECOVERY TECH DATA BLOOD PRESSURE: NORMAL
BH CV SIX MINUTE RECOVERY TECH DATA HEART RATE: 82 BPM
BH CV SIX MINUTE RECOVERY TECH DATA OXYGEN SATURATION: 98 %
BH CV SIX MINUTE RECOVERY TECH DATA SYMPTOMS: NORMAL
BH CV STRESS BP STAGE 1: NORMAL
BH CV STRESS COMMENTS STAGE 1: NORMAL
BH CV STRESS DOSE REGADENOSON STAGE 1: 0.4
BH CV STRESS DURATION MIN STAGE 1: 0
BH CV STRESS DURATION SEC STAGE 1: 10
BH CV STRESS HR STAGE 1: 84
BH CV STRESS NUCLEAR ISOTOPE DOSE: 35 MCI
BH CV STRESS O2 STAGE 1: 98
BH CV STRESS PROTOCOL 1: NORMAL
BH CV STRESS RECOVERY BP: NORMAL MMHG
BH CV STRESS RECOVERY HR: 82 BPM
BH CV STRESS RECOVERY O2: 98 %
BH CV STRESS STAGE 1: 1
BH CV THREE MINUTE POST TECH DATA BLOOD PRESSURE: NORMAL MMHG
BH CV THREE MINUTE POST TECH DATA HEART RATE: 79 BPM
BH CV THREE MINUTE POST TECH DATA OXYGEN SATURATION: 98 %
BH CV THREE MINUTE RECOVERY TECH DATA SYMPTOM: NORMAL
LV EF NUC BP: 67 %
MAXIMAL PREDICTED HEART RATE: 143 BPM
PERCENT MAX PREDICTED HR: 58.74 %
STRESS BASELINE BP: NORMAL MMHG
STRESS BASELINE HR: 59 BPM
STRESS O2 SAT REST: 98 %
STRESS PERCENT HR: 69 %
STRESS POST O2 SAT PEAK: 98 %
STRESS POST PEAK BP: NORMAL MMHG
STRESS POST PEAK HR: 84 BPM
STRESS TARGET HR: 122 BPM

## 2021-08-09 ENCOUNTER — TELEPHONE (OUTPATIENT)
Dept: CARDIOLOGY | Facility: CLINIC | Age: 76
End: 2021-08-09

## 2021-08-09 NOTE — TELEPHONE ENCOUNTER
----- Message from ELVER Garza sent at 8/9/2021  9:36 AM EDT -----  Notify pt stress test negative. Okay for back surgery, have surgeons office send over paperwork for cardiac approval. Keep February follow up

## 2021-08-10 ENCOUNTER — TELEPHONE (OUTPATIENT)
Dept: NEUROSURGERY | Facility: CLINIC | Age: 76
End: 2021-08-10

## 2021-08-10 NOTE — TELEPHONE ENCOUNTER
Caller: MIGUEL DAY    Relationship to patient: SELF    Best call back number: [phone]    Patient is needing: PATIENT IS CALLING STATING SHE HAS RECEIVED CLEARANCE FROM DR. NEVES (CARDIOLOGY) TO PROCEED WITH BACK SURGERY. HIS OFFICE IS REQUESTING DR. MOHAN SEND HIM OVER PAPERWORK FOR CARDIAC APPROVAL. (OFFICE NOTE 07-28-21 IN CHART).     PATIENT WAS LAST SEEN ON 06-08-21 BY ELVER MARSHALL. PLEASE REVIEW AND ADVISE.

## 2021-08-12 ENCOUNTER — PREP FOR SURGERY (OUTPATIENT)
Dept: OTHER | Facility: HOSPITAL | Age: 76
End: 2021-08-12

## 2021-08-12 ENCOUNTER — DOCUMENTATION (OUTPATIENT)
Dept: NEUROSURGERY | Facility: CLINIC | Age: 76
End: 2021-08-12

## 2021-08-12 DIAGNOSIS — M48.062 SPINAL STENOSIS, LUMBAR REGION, WITH NEUROGENIC CLAUDICATION: Primary | ICD-10-CM

## 2021-08-12 RX ORDER — CLINDAMYCIN PHOSPHATE 900 MG/50ML
900 INJECTION INTRAVENOUS ONCE
Status: CANCELLED | OUTPATIENT
Start: 2021-08-12 | End: 2021-08-12

## 2021-08-18 ENCOUNTER — ANESTHESIA EVENT (OUTPATIENT)
Dept: PERIOP | Facility: HOSPITAL | Age: 76
End: 2021-08-18

## 2021-08-24 ENCOUNTER — TELEPHONE (OUTPATIENT)
Dept: NEUROSURGERY | Facility: CLINIC | Age: 76
End: 2021-08-24

## 2021-08-24 NOTE — TELEPHONE ENCOUNTER
Patient was confused about what PAT nurse reviewed with her. She was advised to take atenolol AM of surgery and d/c any supplements or NSAIDS seven days prior to surgery.

## 2021-08-24 NOTE — TELEPHONE ENCOUNTER
PATIENT IS SCHEDULE TO HAVE SURGERY ON 09/01/21 AND IS CONFUSED AS TO WHAT TO DO PRIOR TO SURGERY.   PATIENT ALSO NEEDS TO KNOW IF SHE IS TO TAKE HER HEART MED. PLEASE CALL PATIENT AND ADVISE.    [phone]

## 2021-09-01 ENCOUNTER — ANESTHESIA (OUTPATIENT)
Dept: PERIOP | Facility: HOSPITAL | Age: 76
End: 2021-09-01

## 2021-09-01 ENCOUNTER — HOSPITAL ENCOUNTER (OUTPATIENT)
Facility: HOSPITAL | Age: 76
Setting detail: HOSPITAL OUTPATIENT SURGERY
Discharge: HOME | End: 2021-09-01
Attending: NEUROLOGICAL SURGERY | Admitting: NEUROLOGICAL SURGERY

## 2021-09-01 ENCOUNTER — APPOINTMENT (OUTPATIENT)
Dept: GENERAL RADIOLOGY | Facility: HOSPITAL | Age: 76
End: 2021-09-01

## 2021-09-01 VITALS
TEMPERATURE: 97.8 F | HEIGHT: 62 IN | WEIGHT: 147.71 LBS | BODY MASS INDEX: 27.18 KG/M2 | RESPIRATION RATE: 16 BRPM | SYSTOLIC BLOOD PRESSURE: 148 MMHG | HEART RATE: 63 BPM | DIASTOLIC BLOOD PRESSURE: 72 MMHG | OXYGEN SATURATION: 96 %

## 2021-09-01 DIAGNOSIS — M48.062 SPINAL STENOSIS, LUMBAR REGION, WITH NEUROGENIC CLAUDICATION: ICD-10-CM

## 2021-09-01 LAB — GLUCOSE BLDC GLUCOMTR-MCNC: 126 MG/DL (ref 70–99)

## 2021-09-01 PROCEDURE — 25010000002 MIDAZOLAM PER 1MG: Performed by: ANESTHESIOLOGY

## 2021-09-01 PROCEDURE — 25010000002 HYDROMORPHONE 1 MG/ML SOLUTION: Performed by: ANESTHESIOLOGY

## 2021-09-01 PROCEDURE — S0260 H&P FOR SURGERY: HCPCS | Performed by: NEUROLOGICAL SURGERY

## 2021-09-01 PROCEDURE — 25010000002 FENTANYL CITRATE (PF) 50 MCG/ML SOLUTION: Performed by: ANESTHESIOLOGY

## 2021-09-01 PROCEDURE — 63048 LAM FACETEC &FORAMOT EA ADDL: CPT | Performed by: NEUROLOGICAL SURGERY

## 2021-09-01 PROCEDURE — 63047 LAM FACETEC & FORAMOT LUMBAR: CPT | Performed by: NEUROLOGICAL SURGERY

## 2021-09-01 PROCEDURE — 25010000002 METHYLPREDNISOLONE PER 40 MG: Performed by: NEUROLOGICAL SURGERY

## 2021-09-01 PROCEDURE — 82962 GLUCOSE BLOOD TEST: CPT

## 2021-09-01 PROCEDURE — 25010000002 PROPOFOL 10 MG/ML EMULSION: Performed by: ANESTHESIOLOGY

## 2021-09-01 PROCEDURE — 25010000002 DEXAMETHASONE PER 1 MG: Performed by: ANESTHESIOLOGY

## 2021-09-01 PROCEDURE — C1889 IMPLANT/INSERT DEVICE, NOC: HCPCS | Performed by: NEUROLOGICAL SURGERY

## 2021-09-01 PROCEDURE — 76000 FLUOROSCOPY <1 HR PHYS/QHP: CPT

## 2021-09-01 PROCEDURE — 25010000002 ONDANSETRON PER 1 MG: Performed by: ANESTHESIOLOGY

## 2021-09-01 DEVICE — ABSORBABLE HEMOSTAT (OXIDIZED REGENERATED CELLULOSE, U.S.P.)
Type: IMPLANTABLE DEVICE | Site: BACK | Status: FUNCTIONAL
Brand: SURGICEL

## 2021-09-01 RX ORDER — BUPIVACAINE HYDROCHLORIDE AND EPINEPHRINE 5; 5 MG/ML; UG/ML
INJECTION, SOLUTION EPIDURAL; INTRACAUDAL; PERINEURAL AS NEEDED
Status: DISCONTINUED | OUTPATIENT
Start: 2021-09-01 | End: 2021-09-01 | Stop reason: HOSPADM

## 2021-09-01 RX ORDER — PHENYLEPHRINE HCL IN 0.9% NACL 1 MG/10 ML
SYRINGE (ML) INTRAVENOUS AS NEEDED
Status: DISCONTINUED | OUTPATIENT
Start: 2021-09-01 | End: 2021-09-01 | Stop reason: SURG

## 2021-09-01 RX ORDER — SODIUM CHLORIDE 0.9 % (FLUSH) 0.9 %
10 SYRINGE (ML) INJECTION AS NEEDED
Status: DISCONTINUED | OUTPATIENT
Start: 2021-09-01 | End: 2021-09-01 | Stop reason: HOSPADM

## 2021-09-01 RX ORDER — ONDANSETRON 2 MG/ML
4 INJECTION INTRAMUSCULAR; INTRAVENOUS ONCE AS NEEDED
Status: DISCONTINUED | OUTPATIENT
Start: 2021-09-01 | End: 2021-09-01 | Stop reason: HOSPADM

## 2021-09-01 RX ORDER — METHYLPREDNISOLONE ACETATE 40 MG/ML
INJECTION, SUSPENSION INTRA-ARTICULAR; INTRALESIONAL; INTRAMUSCULAR; SOFT TISSUE AS NEEDED
Status: DISCONTINUED | OUTPATIENT
Start: 2021-09-01 | End: 2021-09-01 | Stop reason: HOSPADM

## 2021-09-01 RX ORDER — ONDANSETRON 2 MG/ML
INJECTION INTRAMUSCULAR; INTRAVENOUS AS NEEDED
Status: DISCONTINUED | OUTPATIENT
Start: 2021-09-01 | End: 2021-09-01 | Stop reason: SURG

## 2021-09-01 RX ORDER — LIDOCAINE HYDROCHLORIDE 20 MG/ML
INJECTION, SOLUTION INFILTRATION; PERINEURAL AS NEEDED
Status: DISCONTINUED | OUTPATIENT
Start: 2021-09-01 | End: 2021-09-01 | Stop reason: SURG

## 2021-09-01 RX ORDER — GLYCOPYRROLATE 0.2 MG/ML
INJECTION INTRAMUSCULAR; INTRAVENOUS AS NEEDED
Status: DISCONTINUED | OUTPATIENT
Start: 2021-09-01 | End: 2021-09-01 | Stop reason: SURG

## 2021-09-01 RX ORDER — GABAPENTIN 300 MG/1
600 CAPSULE ORAL ONCE
Status: COMPLETED | OUTPATIENT
Start: 2021-09-01 | End: 2021-09-01

## 2021-09-01 RX ORDER — IPRATROPIUM BROMIDE AND ALBUTEROL SULFATE 2.5; .5 MG/3ML; MG/3ML
3 SOLUTION RESPIRATORY (INHALATION) ONCE
Status: DISCONTINUED | OUTPATIENT
Start: 2021-09-01 | End: 2021-09-01 | Stop reason: HOSPADM

## 2021-09-01 RX ORDER — DEXAMETHASONE SODIUM PHOSPHATE 4 MG/ML
INJECTION, SOLUTION INTRA-ARTICULAR; INTRALESIONAL; INTRAMUSCULAR; INTRAVENOUS; SOFT TISSUE AS NEEDED
Status: DISCONTINUED | OUTPATIENT
Start: 2021-09-01 | End: 2021-09-01 | Stop reason: SURG

## 2021-09-01 RX ORDER — MAGNESIUM HYDROXIDE 1200 MG/15ML
LIQUID ORAL AS NEEDED
Status: DISCONTINUED | OUTPATIENT
Start: 2021-09-01 | End: 2021-09-01 | Stop reason: HOSPADM

## 2021-09-01 RX ORDER — HYDROCODONE BITARTRATE AND ACETAMINOPHEN 5; 325 MG/1; MG/1
1-2 TABLET ORAL EVERY 4 HOURS PRN
Qty: 25 TABLET | Refills: 0 | Status: SHIPPED | OUTPATIENT
Start: 2021-09-01

## 2021-09-01 RX ORDER — FENTANYL CITRATE 50 UG/ML
INJECTION, SOLUTION INTRAMUSCULAR; INTRAVENOUS AS NEEDED
Status: DISCONTINUED | OUTPATIENT
Start: 2021-09-01 | End: 2021-09-01 | Stop reason: SURG

## 2021-09-01 RX ORDER — PROMETHAZINE HYDROCHLORIDE 25 MG/1
25 SUPPOSITORY RECTAL ONCE AS NEEDED
Status: DISCONTINUED | OUTPATIENT
Start: 2021-09-01 | End: 2021-09-01 | Stop reason: HOSPADM

## 2021-09-01 RX ORDER — ROCURONIUM BROMIDE 10 MG/ML
INJECTION, SOLUTION INTRAVENOUS AS NEEDED
Status: DISCONTINUED | OUTPATIENT
Start: 2021-09-01 | End: 2021-09-01 | Stop reason: SURG

## 2021-09-01 RX ORDER — ACETAMINOPHEN 500 MG
1000 TABLET ORAL ONCE
Status: COMPLETED | OUTPATIENT
Start: 2021-09-01 | End: 2021-09-01

## 2021-09-01 RX ORDER — OXYCODONE HYDROCHLORIDE 5 MG/1
5 TABLET ORAL
Status: DISCONTINUED | OUTPATIENT
Start: 2021-09-01 | End: 2021-09-01 | Stop reason: HOSPADM

## 2021-09-01 RX ORDER — PROMETHAZINE HYDROCHLORIDE 12.5 MG/1
25 TABLET ORAL ONCE AS NEEDED
Status: DISCONTINUED | OUTPATIENT
Start: 2021-09-01 | End: 2021-09-01 | Stop reason: HOSPADM

## 2021-09-01 RX ORDER — MIDAZOLAM HYDROCHLORIDE 2 MG/2ML
2 INJECTION, SOLUTION INTRAMUSCULAR; INTRAVENOUS ONCE
Status: COMPLETED | OUTPATIENT
Start: 2021-09-01 | End: 2021-09-01

## 2021-09-01 RX ORDER — CLINDAMYCIN PHOSPHATE 900 MG/50ML
900 INJECTION INTRAVENOUS ONCE
Status: COMPLETED | OUTPATIENT
Start: 2021-09-01 | End: 2021-09-01

## 2021-09-01 RX ORDER — SODIUM CHLORIDE, SODIUM LACTATE, POTASSIUM CHLORIDE, CALCIUM CHLORIDE 600; 310; 30; 20 MG/100ML; MG/100ML; MG/100ML; MG/100ML
9 INJECTION, SOLUTION INTRAVENOUS CONTINUOUS PRN
Status: DISCONTINUED | OUTPATIENT
Start: 2021-09-01 | End: 2021-09-01 | Stop reason: HOSPADM

## 2021-09-01 RX ORDER — MEPERIDINE HYDROCHLORIDE 25 MG/ML
12.5 INJECTION INTRAMUSCULAR; INTRAVENOUS; SUBCUTANEOUS
Status: DISCONTINUED | OUTPATIENT
Start: 2021-09-01 | End: 2021-09-01 | Stop reason: HOSPADM

## 2021-09-01 RX ADMIN — GABAPENTIN 600 MG: 300 CAPSULE ORAL at 07:14

## 2021-09-01 RX ADMIN — Medication 100 MCG: at 07:40

## 2021-09-01 RX ADMIN — FENTANYL CITRATE 100 MCG: 50 INJECTION INTRAMUSCULAR; INTRAVENOUS at 07:29

## 2021-09-01 RX ADMIN — GLYCOPYRROLATE 0.2 MG: 0.2 INJECTION INTRAMUSCULAR; INTRAVENOUS at 07:44

## 2021-09-01 RX ADMIN — LIDOCAINE HYDROCHLORIDE 100 MG: 20 INJECTION, SOLUTION INFILTRATION; PERINEURAL at 07:28

## 2021-09-01 RX ADMIN — ONDANSETRON 4 MG: 2 INJECTION INTRAMUSCULAR; INTRAVENOUS at 07:40

## 2021-09-01 RX ADMIN — HYDROMORPHONE HYDROCHLORIDE 0.5 MG: 1 INJECTION, SOLUTION INTRAMUSCULAR; INTRAVENOUS; SUBCUTANEOUS at 10:12

## 2021-09-01 RX ADMIN — DEXAMETHASONE SODIUM PHOSPHATE 4 MG: 4 INJECTION INTRA-ARTICULAR; INTRALESIONAL; INTRAMUSCULAR; INTRAVENOUS; SOFT TISSUE at 07:40

## 2021-09-01 RX ADMIN — MIDAZOLAM HYDROCHLORIDE 2 MG: 1 INJECTION, SOLUTION INTRAMUSCULAR; INTRAVENOUS at 07:14

## 2021-09-01 RX ADMIN — PROPOFOL 100 MCG/KG/MIN: 10 INJECTION, EMULSION INTRAVENOUS at 07:29

## 2021-09-01 RX ADMIN — ACETAMINOPHEN 1000 MG: 500 TABLET ORAL at 07:14

## 2021-09-01 RX ADMIN — SODIUM CHLORIDE, POTASSIUM CHLORIDE, SODIUM LACTATE AND CALCIUM CHLORIDE 9 ML/HR: 600; 310; 30; 20 INJECTION, SOLUTION INTRAVENOUS at 07:14

## 2021-09-01 RX ADMIN — ROCURONIUM BROMIDE 30 MG: 10 INJECTION INTRAVENOUS at 07:30

## 2021-09-01 RX ADMIN — CLINDAMYCIN IN 5 PERCENT DEXTROSE 900 MG: 18 INJECTION, SOLUTION INTRAVENOUS at 07:24

## 2021-09-01 NOTE — ANESTHESIA POSTPROCEDURE EVALUATION
Patient: Lizzeth Jean    Procedure Summary     Date: 09/01/21 Room / Location: [facility] OR 05 / [facility] MAIN OR    Anesthesia Start: 0724 Anesthesia Stop: 0935    Procedure: MINIMALLY INVASIVE LUMBAR LAMINECTOMY, RIGHT APPROACH, LUMBAR 4-LUMBAR 5, LUMBAR 5-SACRAL 1 (Right Back) Diagnosis:       Spinal stenosis, lumbar region, with neurogenic claudication      (Spinal stenosis, lumbar region, with neurogenic claudication [M48.062])    Surgeons: Ta More MD Provider: Goldberg, Michael, MD    Anesthesia Type: general ASA Status: 3          Anesthesia Type: general    Vitals  Vitals Value Taken Time   /78 09/01/21 1014   Temp 36.8 °C (98.3 °F) 09/01/21 0945   Pulse 68 09/01/21 1018   Resp 13 09/01/21 1010   SpO2 93 % 09/01/21 1018   Vitals shown include unvalidated device data.        Post Anesthesia Care and Evaluation    Patient location during evaluation: bedside  Patient participation: complete - patient participated  Level of consciousness: awake  Pain management: adequate  Airway patency: patent  Anesthetic complications: No anesthetic complications  PONV Status: none  Cardiovascular status: acceptable and stable  Respiratory status: acceptable  Hydration status: acceptable    Comments: An Anesthesiologist personally participated in the most demanding procedures (including induction and emergence if applicable) in the anesthesia plan, monitored the course of anesthesia administration at frequent intervals and remained physically present and available for immediate diagnosis and treatment of emergencies.

## 2021-09-01 NOTE — OP NOTE
LUMBAR LAMINECTOMY DISCECTOMY  Procedure Report    Patient Name:  Lizzeth Jean  YOB: 1945    Date of Surgery:  9/1/2021     Indications: Lumbar 4-lumbar 5 and lumbar 5-sacral 1 stenosis with radiculopathy    Pre-op Diagnosis:   Spinal stenosis, lumbar region, with neurogenic claudication [M48.062]       Post-Op Diagnosis Codes:     * Spinal stenosis, lumbar region, with neurogenic claudication [M48.062]    Procedure/CPT® Codes:      Procedure(s):  MINIMALLY INVASIVE LUMBAR LAMINECTOMY, RIGHT APPROACH, LUMBAR 4-LUMBAR 5, LUMBAR 5-SACRAL 1    Staff:  Surgeon(s):  Ta More MD    Assistant: Ge Gonzalez    Anesthesia: General    Estimated Blood Loss: 100 mL      Specimen:          Disc (none to pathology)        Findings: Lumbar stenosis worse at L4-5    Complications: No intraoperative complications    Description of Procedure: After informed consent was obtained, the patient was brought to the operating room. After the induction of adequate general endotracheal anesthesia, they were place in the prone position on the Luis Eduardo frame. All pressure points were padded. The back was prepped and draped in the typical fashion. A timeout was performed. The midline was marked and a line roughly 2.5 cms off the midline to the right was drawn. The C-arm and a spinal needle were used to localize the L4-5 and L5-S1 levels. A 2 cm incision was made centered over each resulting in 1 incision slightly less than 4 cm.  The tubular dilator system was used to dilate the tissue first at L4-5  and subsequently L5-S1.  Each level was confirmed fluoroscopy.the microscope was brought in and used for the remainder of the case for improved magnification and illumination.  L4-5 was the first level operated on.  The lamina was cleared of soft tissue using the Bovie electrocautery.  There was difficulty getting the 2 mm Kerrison punch under the L for lamina so the tubular tractor was removed and placed at L5-S1.  At this  level after cleaning the lamina it was easy to get the Kerrison under the lamina it was worked above the ligamentum flavum and the ligamentum flavum was resected inferiorly over the spinal sac and the S1 nerve root.  The S1 nerve root was adequately decompressed as a ball probe passed very freely along its course.  After decompression of the right, the tubular tractor was tilted medially and decompressing the left lateral recess until a ball probe passed freely around the left S1 nerve root.  Hemostasis then obtained using a bipolar electrocautery and Gelfoam soaked with thrombin.  The tubular tractor was reinserted at the L4-5 level.  The laminectomy from L for 5 was extended superiorly.  The facet joint was quite hypertrophied and was drilled down allowing for it to then be undercut until the ball probe passed very freely along the patient's right L5 nerve root.  The lamina was removed until above the insertion of the ligament at L4-5.  The ligament was then resected inferiorly.  It was quite hypertrophied particularly at the L4-5 level.  After decompressing the ligament the ball probe passed freely along the patient's right L5 nerve root.  The tubular tractor was tilted medially and the ligament was undercut from the patient's left lateral recess.  After undercutting and decompressing the left lateral recess, the ball probe passed freely along the patient's left L5 nerve root.  At this point hemostasis was obtained using a bipolar electrocautery, Gelfoam and thrombin as well as the bone wax for the bone edges.  The wound was irrigated with normal saline.  40 mg of Depo-Medrol was placed over the spinal sac and the L5 nerve roots. The tubular retractor was removed and hemostasis assured in the soft tissue. The fascia was reapproximated using a 0 Vicryl and the the skin edges were reapproximated using a subcutaneous 2-0 Vicryl. The wound was dressed with mastisol, steri-strips, Telfa and Tegaderm. All sponge and  needle counts were correct. The patient received a dose of preoperative antibiotics.         Ta More MD     Date: 9/1/2021  Time: 09:36 EDT

## 2021-09-01 NOTE — DISCHARGE INSTRUCTIONS
DISCHARGE INSTRUCTIONS  DISCECTOMY/ LAMINECTOMY  [] MINIMALLY INVASIVE      ? For your surgery you had:  ? General anesthesia (you may have a sore throat for the first 24 hours)  ? IV sedation.  ? Local anesthesia  ? Monitored anesthesia care  ? You received a medicated patch for nausea prevention today (behind your ear). It is recommended that you remove it 24-48 hours post-operatively. It must be removed within 72 hours.   ? You have received an anesthesia medication today that can cause hormonal forms of birth control to be ineffective. You should use a different form of birth control (to prevent pregnancy) for 7 days.   ? You may experience dizziness, drowsiness, or light-headedness for several hours following surgery  ? Do not stay alone today or tonight.  ? Limit your activity for 24 hours.  ? You should not drive, operate machinery, drink alcohol, or sign legally binding documents for 24 hours or while you are taking pain medication.  Activity  ? For the first two weeks following surgery, remain close to home and do not do any lifting, bending or strenuous activity.  Walking is the best exercise and you can increase the amount you walk as you feel like it.  Riding in a car for short distances (15-20 minutes) is okay but do not drive until you are off all narcotic medications.  ? If you have a sedentary job, you may resume work as soon as you feel like it (this is rarely less than one week).  Pain Control  Take your pain medicines as needed and as prescribed.  As you are feeling better, you can decrease the prescribed pain medication and take over-the-counter medications such as Tylenol or Ibuprofen.  The prescribed pain medicines tend to be constipating so it is important to eat a well-balanced diet and take a stool softener if recommended by the doctor.   ? Activity such as walking also helps keep your bowels regular.  Last dose of pain medication was given at:         Incision Care  ? Your sutures are  under the skin and will dissolve in time.  You may shower as soon as you like.    [] You have a clear dressing, which you should remove in 3-4 days.  Beneath this dressing are steri-strips that will peel off over time.  If these steri-strips have not peeled off in 10-14 days, you may remove them.  [] Your incision is closed with skin glue, it remains in place for 5-10 days and naturally sheds itself. By this time your wound should be healed.    ? Gently washing your incision with mild soap and rinsing with water during your shower is all you need to do to care for the incision.  Do not scrub the incision or sit in the bathtub.  Check your incision site each day to see how it looks.  Some redness and bruising is normal for the first few days.  NOTIFY YOUR DOCTOR IF YOU EXPERIENCE ANY OF THE FOLLOWING:   ? You have a fever over 100.8o Fahrenheit orally  ? Shaking chills  ? Your incision is red, hot to touch, or has excessive drainage  ? Your incision starts to separate  ? Increase in bleeding or bleeding that is excessive  ? Nausea, vomiting and/or pain not controlled by prescribed medications  ? Unable to urinate in 6 hours after surgery  ? You may contact 's clinic at [phone] with any questions or concerns.  ? If unable to reach your doctor, please go to the closest emergency room.

## 2021-09-01 NOTE — H&P
[facility]   HISTORY AND PHYSICAL    Patient Name: Lizzeth Jean  : 1945  MRN: [id number]  Primary Care Physician:  Radha Ritchie APRN  Date of admission: 2021    Subjective   Subjective     Chief Complaint: Back and right leg pain    76-year-old female with right greater than left leg pain.  MRI lumbar spine demonstrated stenosis at L4-5 and L5-S1.  She failed conservative interventions and opted for decompression in an attempt to help her leg pain.      Review of Systems   Musculoskeletal: Positive for back pain (With right leg pain).        Personal History     Past Medical History:   Diagnosis Date   • Arthritis    • Back pain    • CAD S/P percutaneous coronary angioplasty 2021    Stent    • Carpal tunnel syndrome 2014   • Cervical spinal stenosis 2016    C3-4 & C4-5   • Cervicalgia    • Chronic pain syndrome    • Diabetes (CMS/HCC)     Pt denies   • Disc degeneration, lumbar 2014   • Essential hypertension    • Gastric reflux    • GERD (gastroesophageal reflux disease)    • Heart attack (CMS/HCC)     Pt denies   • Heart disease    • High cholesterol    • Hip bursitis 2014   • Hypothyroidism    • Leg pain    • Low back pain 2014   • Lumbar pain    • Neck pain    • Stomach disorder    • Thyroid disorder        Past Surgical History:   Procedure Laterality Date   • BREAST BIOPSY     • CARDIAC CATHETERIZATION     • COLONOSCOPY  2016    [place], History of colon polyp    • CORONARY ANGIOPLASTY WITH STENT PLACEMENT     • ENDOSCOPY      [place], Carlos Barretts   • ENDOSCOPY     • EYE SURGERY Bilateral     CATARACT REMOVAL   • HYSTERECTOMY     • UPPER GASTROINTESTINAL ENDOSCOPY  2021    Dr. Leal   • US GUIDED FINE NEEDLE ASPIRATION  2020       Family History: family history includes Heart attack in her maternal grandmother; Lung cancer in her maternal grandmother, mother, and another family member. Otherwise pertinent FHx was reviewed and not  pertinent to current issue.    Social History:  reports that she quit smoking about 30 years ago. She smoked 1.00 pack per day. She has never used smokeless tobacco. She reports previous alcohol use. She reports that she does not use drugs.    Home Medications:  Cinnamon, Collagen, Easy Touch Lancets 30G/Twist, HYDROcodone-acetaminophen, aspirin, atenolol, busPIRone, calcium carbonate, citalopram, felodipine, fluticasone-salmeterol, hydrOXYzine, levothyroxine, nitroglycerin, rosuvastatin, and vitamin D3    Allergies:  Allergies   Allergen Reactions   • Latex Anaphylaxis   • Toprol Xl [Metoprolol] Anaphylaxis   • Niacin Dizziness   • Tricor [Fenofibrate] Hives   • Zocor [Simvastatin] Hives   • Cefdinir Rash   • Lipitor [Atorvastatin] Itching   • Mobic [Meloxicam] Itching   • Morphine Itching       Objective    Objective     Vitals:   Temp:  [97.1 °F (36.2 °C)] 97.1 °F (36.2 °C)  Heart Rate:  [62] 62  Resp:  [16] 16  BP: (162)/(78) 162/78    Physical Exam  Constitutional:       Appearance: She is normal weight.   Cardiovascular:      Comments: No edema  Pulmonary:      Effort: Pulmonary effort is normal.   Neurological:      Mental Status: She is alert.   Psychiatric:         Mood and Affect: Mood normal.         Assessment/Plan   Assessment / Plan     Brief Patient Summary:  Lizzeth Jean is a 76 y.o. female who has symptomatic spinal stenosis    Active Hospital Problems:  Active Hospital Problems    Diagnosis    • **Spinal stenosis, lumbar region, with neurogenic claudication      Plan:   OR today for right approach lumbar 4-lumbar 5 and lumbar 5-sacral 1 minimally invasive laminectomy.  Risk and benefits discussed with patient.    DVT prophylaxis:  Mechanical DVT prophylaxis orders are present.    CODE STATUS:       Admission Status:  I believe this patient meets outpatient status.    Electronically signed by Ta More MD, 09/01/21, 7:13 AM EDT.

## 2021-09-21 ENCOUNTER — OFFICE VISIT (OUTPATIENT)
Dept: NEUROSURGERY | Facility: CLINIC | Age: 76
End: 2021-09-21

## 2021-09-21 VITALS
WEIGHT: 146.1 LBS | HEIGHT: 62 IN | DIASTOLIC BLOOD PRESSURE: 56 MMHG | SYSTOLIC BLOOD PRESSURE: 120 MMHG | BODY MASS INDEX: 26.89 KG/M2

## 2021-09-21 DIAGNOSIS — M48.062 SPINAL STENOSIS, LUMBAR REGION, WITH NEUROGENIC CLAUDICATION: Primary | ICD-10-CM

## 2021-09-21 DIAGNOSIS — Z98.890 STATUS POST LUMBAR LAMINECTOMY: ICD-10-CM

## 2021-09-21 PROCEDURE — 99024 POSTOP FOLLOW-UP VISIT: CPT | Performed by: PHYSICIAN ASSISTANT

## 2021-09-21 NOTE — PROGRESS NOTES
Lizzeth Jean is a 76 y.o. female that presents with Post-op       HPI  Patient presenting for post-op follow-up from minimally invasive laminectomy on right at L4-5 and L5-S1 on 9/1/21. Previously complained of right leg pain in L5 distribution. Patient reports leg pain has resolved at this point. She does report some tightness of the low back with prolonged walking, but this does not bother her much. Denies numbness, tingling or weakness. Reports some swelling of the incision site, but denies redness or drainage. Patient reports she is scheduled for lumbar epidural injection with [facility] in [place] tomorrow, but she will cancel this on account of her improved pain symptoms.    Review of Systems   Musculoskeletal: Positive for back pain.        Vitals:    09/21/21 0917   BP: 120/56        Physical Exam  Constitutional:       Appearance: Normal appearance.   Pulmonary:      Effort: Pulmonary effort is normal.   Musculoskeletal:         General: Swelling (minimal swelling at lumbar incision, without erythema, discharge or induration) present. No tenderness.      Comments: SLR negative bilaterally   Skin:     General: Skin is warm and dry.   Neurological:      General: No focal deficit present.      Mental Status: She is alert and oriented to person, place, and time.      Sensory: No sensory deficit.      Motor: No weakness.      Deep Tendon Reflexes: Reflexes normal.   Psychiatric:         Mood and Affect: Mood normal.         Behavior: Behavior normal.             Assessment and Plan {CC Problem List  Visit Diagnosis  ROS  Review (Popup)  Health Maintenance  Quality  BestPractice  Medications  SmartSets  SnapShot Encounters  Media :23}             Visit Diagnoses     1. Spinal stenosis, lumbar region, with neurogenic claudication    -  Primary  As patient reports improvements in leg and back pain, she is going to hold off on lumbar epidural injections unless her pain returns. Will  keep an eye on incision site and if she has any erythema, pain, or drainage in the area she will report it to us. Will move to PRN follow-ups with us for any returning symptoms or other problems that arise.    2. Status post lumbar laminectomy              Follow Up {Instructions Charge Capture  Follow-up Communications :23}   Patient will follow-up PRN for any returning symptoms or other issues.

## 2021-12-13 ENCOUNTER — HOSPITAL ENCOUNTER (OUTPATIENT)
Dept: ULTRASOUND IMAGING | Facility: HOSPITAL | Age: 76
Discharge: HOME | End: 2021-12-13
Admitting: PHYSICIAN ASSISTANT

## 2021-12-13 DIAGNOSIS — E04.9 GOITER, NODULAR: ICD-10-CM

## 2021-12-13 PROCEDURE — 76536 US EXAM OF HEAD AND NECK: CPT

## 2022-01-18 NOTE — TELEPHONE ENCOUNTER
PT LEFT MESSAGE ON OUR VOICE MAIL  TO TRY AND MOVE UP APT.  I TRIED CALLING [employer] LINE WAS BUSY   What Type Of Note Output Would You Prefer (Optional)?: Standard Output Is This A New Presentation, Or A Follow-Up?: Rash

## 2022-02-08 ENCOUNTER — OFFICE VISIT (OUTPATIENT)
Dept: CARDIOLOGY | Facility: CLINIC | Age: 77
End: 2022-02-08

## 2022-02-08 VITALS
BODY MASS INDEX: 27.23 KG/M2 | HEIGHT: 62 IN | HEART RATE: 64 BPM | SYSTOLIC BLOOD PRESSURE: 110 MMHG | WEIGHT: 148 LBS | DIASTOLIC BLOOD PRESSURE: 49 MMHG

## 2022-02-08 DIAGNOSIS — I10 ESSENTIAL HYPERTENSION: ICD-10-CM

## 2022-02-08 DIAGNOSIS — I25.10 CAD S/P PERCUTANEOUS CORONARY ANGIOPLASTY: Primary | ICD-10-CM

## 2022-02-08 DIAGNOSIS — E78.5 HYPERLIPIDEMIA LDL GOAL <70: ICD-10-CM

## 2022-02-08 DIAGNOSIS — Z98.61 CAD S/P PERCUTANEOUS CORONARY ANGIOPLASTY: Primary | ICD-10-CM

## 2022-02-08 PROCEDURE — 99214 OFFICE O/P EST MOD 30 MIN: CPT | Performed by: INTERNAL MEDICINE

## 2022-02-08 RX ORDER — ROSUVASTATIN CALCIUM 10 MG/1
10 TABLET, COATED ORAL NIGHTLY
Qty: 90 TABLET | Refills: 3 | Status: SHIPPED | OUTPATIENT
Start: 2022-02-08

## 2022-02-08 RX ORDER — CLOBETASOL PROPIONATE 0.05 MG/G
GEL TOPICAL 2 TIMES DAILY
COMMUNITY

## 2022-02-08 NOTE — ASSESSMENT & PLAN NOTE
Continue on Crestor 10 mg nightly tolerating well attempted to obtain labs from her PCP to see if LDL is at goal continue with current dose of the time being

## 2022-02-08 NOTE — ASSESSMENT & PLAN NOTE
Patient with controlled blood pressure continue on atenolol 25 mg once a day and felodipine 10 mg  Counseled patient on  • low-sodium diet of less than 2 g  • Aerobic activity 30 minutes a day 5 times a week  • Weight loss

## 2022-02-08 NOTE — PROGRESS NOTES
Chief Complaint  Coronary Artery Disease    Subjective    Patient is doing well symptomatically has not had any issues with chest pain or shortness of breath    Past Medical History:   Diagnosis Date   • Arthritis    • Back pain    • CAD S/P percutaneous coronary angioplasty 7/28/2021    Stent 2002   • Carpal tunnel syndrome 04/16/2014   • Cervical spinal stenosis 03/22/2016    C3-4 & C4-5   • Cervicalgia    • Chronic pain syndrome    • Diabetes (HCC)     Pt denies   • Disc degeneration, lumbar 04/16/2014   • Essential hypertension    • Gastric reflux    • GERD (gastroesophageal reflux disease)    • Heart attack (HCC)     Pt denies   • Heart disease    • High cholesterol    • Hip bursitis 04/01/2014   • Hypothyroidism    • Leg pain    • Low back pain 07/24/2014   • Lumbar pain    • Neck pain    • Stomach disorder    • Thyroid disorder          Current Outpatient Medications:   •  aspirin 81 MG chewable tablet, Chew 81 mg Daily. Last taken around 1 month ago, may start back after procedure, Disp: , Rfl:   •  atenolol (TENORMIN) 25 MG tablet, Take 25 mg by mouth Daily., Disp: , Rfl:   •  busPIRone (BUSPAR) 15 MG tablet, Take 7.5 mg by mouth 3 (Three) Times a Day As Needed., Disp: , Rfl:   •  Cinnamon 500 MG capsule, Take 500 mg by mouth Daily., Disp: , Rfl:   •  citalopram (CeleXA) 40 MG tablet, Take 40 mg by mouth Daily., Disp: , Rfl:   •  clobetasol (TEMOVATE) 0.05 % gel, 2 (Two) Times a Day., Disp: , Rfl:   •  Easy Touch Lancets 30G/Twist misc, 1 each by Other route 3 (Three) Times a Day. use to test blood sugar 3 times daily, Disp: , Rfl:   •  felodipine (PLENDIL) 10 MG 24 hr tablet, Take 10 mg by mouth Daily., Disp: , Rfl:   •  fluticasone-salmeterol (Advair Diskus) 250-50 MCG/DOSE DISKUS, Inhale 2 puffs 2 (Two) Times a Day., Disp: , Rfl:   •  HYDROcodone-acetaminophen (NORCO) 5-325 MG per tablet, Take 1-2 tablets by mouth Every 4 (Four) Hours As Needed (Pain)., Disp: 25 tablet, Rfl: 0  •  hydrOXYzine (ATARAX) 25  "MG tablet, Take 25 mg by mouth Daily., Disp: , Rfl:   •  levothyroxine (Synthroid) 25 MCG tablet, Take 25 mcg by mouth Every Morning., Disp: , Rfl:   •  nitroglycerin (NITROSTAT) 0.4 MG SL tablet, Place 0.4 mg under the tongue Every 5 (Five) Minutes As Needed., Disp: , Rfl:   •  rosuvastatin (CRESTOR) 10 MG tablet, Take 1 tablet by mouth Every Night., Disp: 90 tablet, Rfl: 3  •  vitamin D3 (Vitamin D) 125 MCG (5000 UT) capsule capsule, Take 5,000 Units by mouth Daily., Disp: , Rfl:     Medications Discontinued During This Encounter   Medication Reason   • COLLAGEN PO Discontinued by another clinician   • calcium carbonate (OS-SHIRA) 600 MG tablet Discontinued by another clinician   • rosuvastatin (CRESTOR) 10 MG tablet Reorder     Allergies   Allergen Reactions   • Latex Anaphylaxis   • Toprol Xl [Metoprolol] Anaphylaxis   • Niacin Dizziness   • Tricor [Fenofibrate] Hives   • Zocor [Simvastatin] Hives   • Cefdinir Rash   • Lipitor [Atorvastatin] Itching   • Mobic [Meloxicam] Itching   • Morphine Itching        Social History     Tobacco Use   • Smoking status: Former Smoker     Packs/day: 1.00     Types: Cigarettes     Start date:      Quit date:      Years since quittin.1   • Smokeless tobacco: Never Used   Vaping Use   • Vaping Use: Never used   Substance Use Topics   • Alcohol use: Not Currently   • Drug use: Never       Family History   Problem Relation Age of Onset   • Lung cancer Mother    • Heart attack Maternal Grandmother    • Lung cancer Other    • Colon cancer Maternal Grandfather         Objective     /49   Pulse 64   Ht 157.5 cm (62\")   Wt 67.1 kg (148 lb)   BMI 27.07 kg/m²       Physical Exam    General Appearance:   · no acute distress  · Alert and oriented x3  HENT:   · lips not cyanotic  · Atraumatic  Neck:  · No jvd   · supple  Respiratory:  · no respiratory distress  · normal breath sounds  · no rales  Cardiovascular:  · Regular rate and rhythm  · no S3, no S4   · no " murmur  · no rub  Extremities  · No cyanosis  · lower extremity edema: none    Skin:   · warm, dry  · No rashes      Result Review :     No results found for: PROBNP       Lab Results   Component Value Date    TSH 1.660 09/11/2020      Lab Results   Component Value Date    FREET4 1.3 09/11/2020      No results found for: DDIMERQUANT  Magnesium   Date Value Ref Range Status   12/09/2019 2.22 1.60 - 2.30 mg/dL Final      No results found for: DIGOXIN   Lab Results   Component Value Date    TROPONINT <0.01 12/09/2019             No results found for: POCTROP                   Diagnoses and all orders for this visit:    1. CAD S/P percutaneous coronary angioplasty (Primary)  Assessment & Plan:  Patient is doing well no ongoing angina continue with chronic aspirin 81 mg daily        2. Hyperlipidemia LDL goal <70  Assessment & Plan:  Continue on Crestor 10 mg nightly tolerating well attempted to obtain labs from her PCP to see if LDL is at goal continue with current dose of the time being      3. Essential hypertension  Assessment & Plan:  Patient with controlled blood pressure continue on atenolol 25 mg once a day and felodipine 10 mg  Counseled patient on  • low-sodium diet of less than 2 g  • Aerobic activity 30 minutes a day 5 times a week  • Weight loss          Other orders  -     rosuvastatin (CRESTOR) 10 MG tablet; Take 1 tablet by mouth Every Night.  Dispense: 90 tablet; Refill: 3          Follow Up     Return in about 6 months (around 8/8/2022) for Follow with Joleen Sy, EKG with F/U.          Patient was given instructions and counseling regarding her condition or for health maintenance advice. Please see specific information pulled into the AVS if appropriate.

## 2022-03-31 DIAGNOSIS — E78.5 HYPERLIPIDEMIA LDL GOAL <70: ICD-10-CM

## 2022-04-01 ENCOUNTER — TELEPHONE (OUTPATIENT)
Dept: CARDIOLOGY | Facility: CLINIC | Age: 77
End: 2022-04-01

## 2022-04-01 RX ORDER — ROSUVASTATIN CALCIUM 20 MG/1
20 TABLET, COATED ORAL NIGHTLY
Qty: 90 TABLET | Refills: 3 | Status: SHIPPED | OUTPATIENT
Start: 2022-04-01

## 2022-04-01 NOTE — TELEPHONE ENCOUNTER
Notify pt LDL is not to goal of less than 70. Would recommend increasing crestor dose from 10 mg nightly to 20 mg nightly and recheck lipid panel in 3 months    Spoke with pt and informed her of [facility] recommendations.  Pt understood.  Sent prescription to her pharmacy.

## (undated) DEVICE — BUR DISSCT FLUT MATCHSTK 3MM

## (undated) DEVICE — SUT NUROLON 4/0 TF18 CR8 I8IN C584D

## (undated) DEVICE — BUR DISSCT FLUT MATCHSTK MIN/ACCESS 3MM

## (undated) DEVICE — SUT PROLN 6/0 BV1 D/A 30IN 8709H

## (undated) DEVICE — STERILE POLYISOPRENE POWDER-FREE SURGICAL GLOVES WITH EMOLLIENT COATING: Brand: PROTEXIS

## (undated) DEVICE — SUT VIC 2/0 CT1 CR8 18IN J839D

## (undated) DEVICE — CUFF SCD HEMOFORCE SEQ CALF STD MD

## (undated) DEVICE — BLANKT WARM PACU MISTRAL/AIR PREM REFL A/ 85.8X50IN

## (undated) DEVICE — 3M(TM) TEGADERM(TM) IV TRANSPARENT FILM DRESSING WITH BORDER 1650: Brand: 3M™ TEGADERM™

## (undated) DEVICE — LAMINECTOMY CERVICAL DISC-LF: Brand: MEDLINE INDUSTRIES, INC.

## (undated) DEVICE — GLV SURG PREMIERPRO GAMMEX NEOPRN PF SZ7.5 GRN

## (undated) DEVICE — DRP MICROSCP LECIA W/CLEARLENS 137X381CM

## (undated) DEVICE — UNDYED BRAIDED (POLYGLACTIN 910), SYNTHETIC ABSORBABLE SUTURE: Brand: COATED VICRYL

## (undated) DEVICE — GLV SURG BIOGEL LTX PF 7 1/2

## (undated) DEVICE — GAUZE,SPONGE,4"X4",16PLY,STRL,LF,10/TRAY: Brand: MEDLINE

## (undated) DEVICE — 3M™ STERI-DRAPE™ INSTRUMENT POUCH 1018: Brand: STERI-DRAPE™

## (undated) DEVICE — SUT ETHILON 3/0 30IN BLK

## (undated) DEVICE — URINE METER: Brand: DOVER